# Patient Record
Sex: MALE | Race: WHITE | NOT HISPANIC OR LATINO
[De-identification: names, ages, dates, MRNs, and addresses within clinical notes are randomized per-mention and may not be internally consistent; named-entity substitution may affect disease eponyms.]

---

## 2022-01-06 PROBLEM — Z00.00 ENCOUNTER FOR PREVENTIVE HEALTH EXAMINATION: Status: ACTIVE | Noted: 2022-01-06

## 2022-03-02 ENCOUNTER — APPOINTMENT (OUTPATIENT)
Dept: NEUROSURGERY | Facility: CLINIC | Age: 72
End: 2022-03-02
Payer: MEDICARE

## 2022-03-02 VITALS
HEART RATE: 67 BPM | DIASTOLIC BLOOD PRESSURE: 81 MMHG | HEIGHT: 70 IN | WEIGHT: 240 LBS | BODY MASS INDEX: 34.36 KG/M2 | TEMPERATURE: 97.5 F | SYSTOLIC BLOOD PRESSURE: 136 MMHG | OXYGEN SATURATION: 97 %

## 2022-03-02 DIAGNOSIS — G25.81 RESTLESS LEGS SYNDROME: ICD-10-CM

## 2022-03-02 DIAGNOSIS — Z81.8 FAMILY HISTORY OF OTHER MENTAL AND BEHAVIORAL DISORDERS: ICD-10-CM

## 2022-03-02 DIAGNOSIS — Z80.9 FAMILY HISTORY OF MALIGNANT NEOPLASM, UNSPECIFIED: ICD-10-CM

## 2022-03-02 DIAGNOSIS — Z78.9 OTHER SPECIFIED HEALTH STATUS: ICD-10-CM

## 2022-03-02 PROCEDURE — 99203 OFFICE O/P NEW LOW 30 MIN: CPT

## 2022-03-02 RX ORDER — ATORVASTATIN CALCIUM 10 MG/1
10 TABLET, FILM COATED ORAL
Refills: 0 | Status: ACTIVE | COMMUNITY

## 2022-03-15 NOTE — CONSULT LETTER
[Dear  ___] : Dear  [unfilled], [Courtesy Letter:] : I had the pleasure of seeing your patient, [unfilled], in my office today. [Sincerely,] : Sincerely, [FreeTextEntry2] : Jose Hall MD\par [address]\[place] [address] [FreeTextEntry1] : This very pleasant now 72-year-old gentleman is seen today referred by neurology to assess possible diagnosis of normal pressure hydrocephalus.  The patient is a retired .  He indicates onset event occurred approximately 1 year ago when he had been out to [facility] with family and had an episode of uncontrolled balance where he was leaning forward and race walking and almost fell.  There have been a lesser events.  The patient's wife states that he has begun to stoop forward when he is walking and appears to be unbalanced.  He has not had any falls.  He has significant degeneration and osteoarthritis involving the left knee and has undergone multiple procedures.  He also has had hand and wrist surgeries for Dupuytren's contractures.  The patient has significant family history of early onset dementia in his father.  The patient denies any significant cognitive changes with respect to short-term memory or attention to multiple tasks.  He denies any headaches.  There have not been any visual changes.  He denies any urinary urgency or incontinence.\par \par I have reviewed with the patient and his wife directly the MRI images of the brain performed at [facility].  The significant finding is of expansion of the lateral ventricles and some expansion of the sylvian fissures which is more consistent with age-related brain changes than true expansion of the ventricles under pressure.  There is no significant evidence of transependymal flow.  There are some deep white matter nonspecific changes consistent with microvascular changes.  The cerebellum and brainstem are of normal anatomy.\par \par On examination the patient is in no acute distress.  The patient is articulate and focused and is able to describe his condition and symptoms in detail.  The patient is able to stand spontaneously without any assistance and has good balance.  Romberg test is negative.  He is able to initiate walking without any difficulty.  He has a good narrow stance and his stride is symmetric bilaterally with good clearance.  The patient has very reduced arm swing when walking and a very stiff upper body posture.  He is able to perform a 180 and 360 degree turn with only minimal hesitation.  The patient has no back pain.  There is no limitations of back range of motion.  There is no evidence of radiculopathy.  There is no pronator drift.  The patient does demonstrate some tremor in his left hand with movement.  There is no evidence of pin rolling.  His facial animation appears to be normal.  There is no abnormal movement of the legs at rest but the patient does indicate at nighttime and sometimes at home he will have what he describes as restless legs.\par \par Based on the patient's symptom profile and his physical examination he does not have any of the hallmarks of normal pressure hydrocephalus.  As a result an attempt at lumbar puncture or placement of a lumbar drain for CSF evacuation would not be appropriate.  The patient certainly has difficulty with intention tremor and with restless legs.  There is also concern that he may have progressive age-related changes in mentation based on family history and the MRI imaging.  I have therefore made a referral on to neurology for further ongoing support and evaluation.  At this time I have not arranged for any further follow-up but would be pleased to see the patient again at any time should the need arise.\par \par Thank you for very kindly including me in the evaluation and support of your patient.  Please do not hesitate contact me should you have any concerns or questions regarding this evaluation or the patient's ongoing follow-up care. [FreeTextEntry3] : Andres Krishna MD, PhD, FRCPSC \par Attending Neurosurgeon \par  of Neurosurgery \par [facility] \par [address], [address]nd floor \[place] [address] \par Office: [phone] \par Fax: [phone]\par \par

## 2022-03-15 NOTE — REASON FOR VISIT
[New Patient Visit] : a new patient visit [Spouse] : spouse [FreeTextEntry1] : to evaluate possible normal pressure hydrocephalus

## 2022-05-24 ENCOUNTER — NON-APPOINTMENT (OUTPATIENT)
Age: 72
End: 2022-05-24

## 2022-05-24 ENCOUNTER — APPOINTMENT (OUTPATIENT)
Dept: NEUROLOGY | Facility: CLINIC | Age: 72
End: 2022-05-24
Payer: MEDICARE

## 2022-05-24 VITALS
WEIGHT: 240 LBS | BODY MASS INDEX: 34.36 KG/M2 | TEMPERATURE: 97.8 F | HEIGHT: 70 IN | SYSTOLIC BLOOD PRESSURE: 129 MMHG | DIASTOLIC BLOOD PRESSURE: 88 MMHG | HEART RATE: 64 BPM

## 2022-05-24 PROCEDURE — 99203 OFFICE O/P NEW LOW 30 MIN: CPT

## 2022-05-24 NOTE — HISTORY OF PRESENT ILLNESS
[FreeTextEntry1] : 72-year-old man accompanied by his wife, with complaints of 2 transient short lasting episodes of trouble with balance, in both occasions, he had had a couple of alcoholic beverages, and  found himself feeling as he is falling, no dizziness or vertigo, nausea or speech, trouble with vision, no U. lateral weakness or numbness. In between is noted no significant problem with balance or walking except for painful left knee.\par No numbness tingling of the extremities.He reports an occasional tremor of the hand holding onto objects.\par patient was evaluated by another neurologist, with an MRI of the brain, MR A. old brain and neck. mRI of brain,read as mild hydrocephalus.MRA of brain and neck did not demonstrate any significant stenosis.\par Denies any trouble with vision, no double vision,trouble swallowing, no change in speech, no changes, or constipation.

## 2022-05-24 NOTE — DISCUSSION/SUMMARY
[FreeTextEntry1] : 72-year-old man with 2 episodes of transient loss of balance,both of these episodes associated with use of alcohol. Examination mile parkinsonian symptoms on the right.No evidence of acute ischemia, by MRI, incidental enlarged ventricle, likely not normal pressure hydrocephalus.\par suggestion recommended followup in 4-6 months.\par consider NERY scan (patient wants to wait at this time).

## 2022-05-24 NOTE — PHYSICAL EXAM
[General Appearance - Alert] : alert [General Appearance - In No Acute Distress] : in no acute distress [Oriented To Time, Place, And Person] : oriented to person, place, and time [Impaired Insight] : insight and judgment were intact [Affect] : the affect was normal [Person] : oriented to person [Place] : oriented to place [Time] : oriented to time [Concentration Intact] : normal concentrating ability [Visual Intact] : visual attention was ~T not ~L decreased [Naming Objects] : no difficulty naming common objects [Repeating Phrases] : no difficulty repeating a phrase [Writing A Sentence] : no difficulty writing a sentence [Fluency] : fluency intact [Comprehension] : comprehension intact [Reading] : reading intact [Past History] : adequate knowledge of personal past history [Cranial Nerves Optic (II)] : visual acuity intact bilaterally,  visual fields full to confrontation, pupils equal round and reactive to light [Cranial Nerves Oculomotor (III)] : extraocular motion intact [Cranial Nerves Trigeminal (V)] : facial sensation intact symmetrically [Cranial Nerves Facial (VII)] : face symmetrical [Cranial Nerves Vestibulocochlear (VIII)] : hearing was intact bilaterally [Cranial Nerves Glossopharyngeal (IX)] : tongue and palate midline [Cranial Nerves Accessory (XI - Cranial And Spinal)] : head turning and shoulder shrug symmetric [Cranial Nerves Hypoglossal (XII)] : there was no tongue deviation with protrusion [Motor Strength] : muscle strength was normal in all four extremities [No Muscle Atrophy] : normal bulk in all four extremities [Sensation Tactile Decrease] : light touch was intact [Romberg's Sign] : Romberg's sign was negtive [Abnormal Walk] : normal gait [Balance] : balance was intact [Past-pointing] : there was no past-pointing [Tremor] : a tremor present [Dysdiadochokinesia Bilaterally] : not present [Coordination - Dysmetria Impaired Finger-to-Nose Bilateral] : not present [Coordination - Dysmetria Impaired Heel-to-Shin Bilateral] : not present [1+] : Ankle jerk left 1+ [___] : absent on the right [___] : absent on the left [FreeTextEntry8] : postural tremor of the hands. Questionable mild cogwheeling of the right upper extremity. mildly decreased arm swing, right more than left

## 2022-06-14 ENCOUNTER — APPOINTMENT (OUTPATIENT)
Dept: ORTHOPEDIC SURGERY | Facility: CLINIC | Age: 72
End: 2022-06-14
Payer: MEDICARE

## 2022-06-14 ENCOUNTER — RESULT CHARGE (OUTPATIENT)
Age: 72
End: 2022-06-14

## 2022-06-14 ENCOUNTER — APPOINTMENT (OUTPATIENT)
Dept: ORTHOPEDIC SURGERY | Facility: CLINIC | Age: 72
End: 2022-06-14

## 2022-06-14 VITALS — BODY MASS INDEX: 33.6 KG/M2 | WEIGHT: 240 LBS | HEIGHT: 71 IN

## 2022-06-14 PROBLEM — Z00.00 ENCOUNTER FOR PREVENTIVE HEALTH EXAMINATION: Noted: 2022-06-14

## 2022-06-14 PROCEDURE — 99205 OFFICE O/P NEW HI 60 MIN: CPT

## 2022-06-14 PROCEDURE — 73562 X-RAY EXAM OF KNEE 3: CPT | Mod: LT

## 2022-06-14 NOTE — ASSESSMENT
[FreeTextEntry1] : 6/14/22: Discussed anti-inflammatories, PT/HEP, CSI, visco injections, and TKA. He has done conservative treatment that is no longer providing any relief. Pain affects his ADLs. Discussed TKA, r/b/a, and preop/postop periods in detail. Discussed possible injury to skin from his hx of multiple scars from previous surgeries; will address this accordingly. He would like to proceed with surgery. Will obtain CT L knee to eval for bone loss and for presurgical eval.

## 2022-06-14 NOTE — DISCUSSION/SUMMARY
[de-identified] : The natural progression of Osteoarthritis was explained to the patient.  We discussed the possible treatment options from conservative to operative.  These included NSAIDS, Glucosamine and Chondrotin sulfate, and Physical Therapy as well different types of injections.  We also discussed that at some point they may progress to needed a TKA.  Information and pamphlets were given.\par \par We discussed my findings and the natural history of their condition.  We talked about the details of the proposed surgery and the recovery.  We discussed the material risks, possible benefits and alternatives to surgery.  The risks include but are not limited to infection, bleeding and possible need for blood transfusion, fracture, bowel blockage, bladder retention or infection, need for reoperation, stiffness and/or limited range of motion, possible damage to nerves and blood vessels, failure of fixation of components, risk of deep vein thromboses and pulmonary embolism, wound healing problems, dislocation, and possible leg length discrepancy.  Although incredibly rare, we also discussed the risks of a cardiac event, stroke and even death during, or following, the surgery.  We discussed the type of implants the patient will be receiving and the type of fixation that will be used, as well as whether a robot or computer navigation aide will be used.  The patient understands they will need medical clearance and will attend a preoperative joint education class.  We also discussed the type of anesthesia they will receive, and the risks associated with hospital or rehab length of stay, obesity, diabetes and smoking.\par \par Progress note completed by Shellie Sandra PA-C.\par The documentation recorded by the PA accurately reflects the service I personally performed and the decisions made by me. -Dr. Infante\par

## 2022-06-14 NOTE — HISTORY OF PRESENT ILLNESS
[Sudden] : sudden [7] : 7 [4] : 4 [Dull/Aching] : dull/aching [Throbbing] : throbbing [de-identified] : 6/14/22: 71yo M with longstanding left knee pain for the past few years with no injury. Hx of multiple arthroscopies in this knee many years ago. He has been treated by outside ortho for this issue with anti-inflammatories, CSI, and visco inj. Admits to increasing pain and difficulty with prolonged walking and stairs.  [] : no [FreeTextEntry5] : has seen another orthopedic and has gotten sx relief Motrin, Tylenol, CBD cream has tried CSI, gel shots

## 2022-06-14 NOTE — IMAGING
[Left] : left knee [de-identified] : Left knee\par Multiple scars from previous surgeries \par Medial and lateral joint line tenderness\par ROM 0-115 [FreeTextEntry9] : Adv knee OA

## 2022-06-19 ENCOUNTER — FORM ENCOUNTER (OUTPATIENT)
Age: 72
End: 2022-06-19

## 2022-06-20 ENCOUNTER — APPOINTMENT (OUTPATIENT)
Dept: CT IMAGING | Facility: CLINIC | Age: 72
End: 2022-06-20
Payer: MEDICARE

## 2022-06-20 PROCEDURE — 76376 3D RENDER W/INTRP POSTPROCES: CPT | Mod: LT

## 2022-06-20 PROCEDURE — 73700 CT LOWER EXTREMITY W/O DYE: CPT | Mod: LT

## 2022-08-08 ENCOUNTER — OUTPATIENT (OUTPATIENT)
Dept: OUTPATIENT SERVICES | Facility: HOSPITAL | Age: 72
LOS: 1 days | Discharge: HOME | End: 2022-08-08

## 2022-08-08 VITALS
HEIGHT: 71 IN | OXYGEN SATURATION: 98 % | HEART RATE: 60 BPM | WEIGHT: 252.43 LBS | TEMPERATURE: 98 F | SYSTOLIC BLOOD PRESSURE: 126 MMHG | DIASTOLIC BLOOD PRESSURE: 73 MMHG | RESPIRATION RATE: 18 BRPM

## 2022-08-08 DIAGNOSIS — Z98.890 OTHER SPECIFIED POSTPROCEDURAL STATES: Chronic | ICD-10-CM

## 2022-08-08 DIAGNOSIS — Z01.818 ENCOUNTER FOR OTHER PREPROCEDURAL EXAMINATION: ICD-10-CM

## 2022-08-08 DIAGNOSIS — E78.5 HYPERLIPIDEMIA, UNSPECIFIED: ICD-10-CM

## 2022-08-08 DIAGNOSIS — M17.12 UNILATERAL PRIMARY OSTEOARTHRITIS, LEFT KNEE: ICD-10-CM

## 2022-08-08 LAB
A1C WITH ESTIMATED AVERAGE GLUCOSE RESULT: 5.6 % — SIGNIFICANT CHANGE UP (ref 4–5.6)
ALBUMIN SERPL ELPH-MCNC: 3.9 G/DL — SIGNIFICANT CHANGE UP (ref 3.3–5)
ALP SERPL-CCNC: 81 U/L — SIGNIFICANT CHANGE UP (ref 40–120)
ALT FLD-CCNC: 37 U/L — SIGNIFICANT CHANGE UP (ref 12–78)
ANION GAP SERPL CALC-SCNC: 8 MMOL/L — SIGNIFICANT CHANGE UP (ref 5–17)
APTT BLD: 28 SEC — SIGNIFICANT CHANGE UP (ref 27.5–35.5)
AST SERPL-CCNC: 20 U/L — SIGNIFICANT CHANGE UP (ref 15–37)
BASOPHILS # BLD AUTO: 0.06 K/UL — SIGNIFICANT CHANGE UP (ref 0–0.2)
BASOPHILS NFR BLD AUTO: 1 % — SIGNIFICANT CHANGE UP (ref 0–2)
BILIRUB SERPL-MCNC: 0.4 MG/DL — SIGNIFICANT CHANGE UP (ref 0.2–1.2)
BLD GP AB SCN SERPL QL: SIGNIFICANT CHANGE UP
BUN SERPL-MCNC: 21 MG/DL — SIGNIFICANT CHANGE UP (ref 7–23)
CALCIUM SERPL-MCNC: 9 MG/DL — SIGNIFICANT CHANGE UP (ref 8.5–10.1)
CHLORIDE SERPL-SCNC: 110 MMOL/L — HIGH (ref 96–108)
CO2 SERPL-SCNC: 24 MMOL/L — SIGNIFICANT CHANGE UP (ref 22–31)
CREAT SERPL-MCNC: 1.35 MG/DL — HIGH (ref 0.5–1.3)
EGFR: 56 ML/MIN/1.73M2 — LOW
EOSINOPHIL # BLD AUTO: 0.15 K/UL — SIGNIFICANT CHANGE UP (ref 0–0.5)
EOSINOPHIL NFR BLD AUTO: 2.4 % — SIGNIFICANT CHANGE UP (ref 0–6)
ESTIMATED AVERAGE GLUCOSE: 114 MG/DL — SIGNIFICANT CHANGE UP (ref 68–114)
GLUCOSE SERPL-MCNC: 97 MG/DL — SIGNIFICANT CHANGE UP (ref 70–99)
HCT VFR BLD CALC: 45.1 % — SIGNIFICANT CHANGE UP (ref 39–50)
HGB BLD-MCNC: 15 G/DL — SIGNIFICANT CHANGE UP (ref 13–17)
IMM GRANULOCYTES NFR BLD AUTO: 0.3 % — SIGNIFICANT CHANGE UP (ref 0–1.5)
INR BLD: 0.94 RATIO — SIGNIFICANT CHANGE UP (ref 0.88–1.16)
LYMPHOCYTES # BLD AUTO: 1.37 K/UL — SIGNIFICANT CHANGE UP (ref 1–3.3)
LYMPHOCYTES # BLD AUTO: 21.9 % — SIGNIFICANT CHANGE UP (ref 13–44)
MCHC RBC-ENTMCNC: 30.5 PG — SIGNIFICANT CHANGE UP (ref 27–34)
MCHC RBC-ENTMCNC: 33.3 G/DL — SIGNIFICANT CHANGE UP (ref 32–36)
MCV RBC AUTO: 91.7 FL — SIGNIFICANT CHANGE UP (ref 80–100)
MONOCYTES # BLD AUTO: 0.61 K/UL — SIGNIFICANT CHANGE UP (ref 0–0.9)
MONOCYTES NFR BLD AUTO: 9.7 % — SIGNIFICANT CHANGE UP (ref 2–14)
MRSA PCR RESULT.: SIGNIFICANT CHANGE UP
NEUTROPHILS # BLD AUTO: 4.05 K/UL — SIGNIFICANT CHANGE UP (ref 1.8–7.4)
NEUTROPHILS NFR BLD AUTO: 64.7 % — SIGNIFICANT CHANGE UP (ref 43–77)
NRBC # BLD: 0 /100 WBCS — SIGNIFICANT CHANGE UP (ref 0–0)
PLATELET # BLD AUTO: 254 K/UL — SIGNIFICANT CHANGE UP (ref 150–400)
POTASSIUM SERPL-MCNC: 4.1 MMOL/L — SIGNIFICANT CHANGE UP (ref 3.5–5.3)
POTASSIUM SERPL-SCNC: 4.1 MMOL/L — SIGNIFICANT CHANGE UP (ref 3.5–5.3)
PROT SERPL-MCNC: 7.3 GM/DL — SIGNIFICANT CHANGE UP (ref 6–8.3)
PROTHROM AB SERPL-ACNC: 11.2 SEC — SIGNIFICANT CHANGE UP (ref 10.5–13.4)
RBC # BLD: 4.92 M/UL — SIGNIFICANT CHANGE UP (ref 4.2–5.8)
RBC # FLD: 14 % — SIGNIFICANT CHANGE UP (ref 10.3–14.5)
S AUREUS DNA NOSE QL NAA+PROBE: SIGNIFICANT CHANGE UP
SODIUM SERPL-SCNC: 142 MMOL/L — SIGNIFICANT CHANGE UP (ref 135–145)
VIT D25+D1,25 OH+D1,25 PNL SERPL-MCNC: 54 PG/ML — SIGNIFICANT CHANGE UP (ref 19.9–79.3)
WBC # BLD: 6.26 K/UL — SIGNIFICANT CHANGE UP (ref 3.8–10.5)
WBC # FLD AUTO: 6.26 K/UL — SIGNIFICANT CHANGE UP (ref 3.8–10.5)

## 2022-08-08 PROCEDURE — 93010 ELECTROCARDIOGRAM REPORT: CPT

## 2022-08-08 NOTE — H&P PST ADULT - NSICDXPASTSURGICALHX_GEN_ALL_CORE_FT
PAST SURGICAL HISTORY:  H/O arthroscopy of left knee x3    H/O thumb surgery left thumb- nerve impigment

## 2022-08-08 NOTE — OCCUPATIONAL THERAPY INITIAL EVALUATION ADULT - PERTINENT HX OF CURRENT PROBLEM, REHAB EVAL
Pt is a 71 y/o male slated for elective surgery for TKR wit MD Infante on 8/25/22, due to OA, chronic. Pt reported daily buckling in his left knee, but denied any  falls in the past 3-6 months  pain and DJD. Pt is a 73 y/o male slated for elective surgery for TKR with MD Infante on 8/25/22, due to OA, chronic. Pt reported daily buckling in his left knee, but denied any  falls in the past 3-6 months  pain and DJD.

## 2022-08-08 NOTE — OCCUPATIONAL THERAPY INITIAL EVALUATION ADULT - ANTICIPATED DISCHARGE DISPOSITION, OT EVAL
Recommend home with OT referral to enable patient to safely perform ADL management and functional mobility. Pt owns a 3-in-1 commode, rolling walker and SAC. All are in good condition and easily accessible .

## 2022-08-08 NOTE — OCCUPATIONAL THERAPY INITIAL EVALUATION ADULT - RANGE OF MOTION EXAMINATION, LOWER EXTREMITY
Left LE Passive ROM was WFL (w/i functional limits)/Left LE Active Assistive ROM was WFL (within functional limits)/Right LE Active ROM was WNL(within normal limits)/Right LE Passive ROM was WNL (within normal limits)

## 2022-08-08 NOTE — OCCUPATIONAL THERAPY INITIAL EVALUATION ADULT - LIVES WITH, PROFILE
in a private house with 2 entry steps from the garage equipped with bilateral hand rails that can be reached simultaneously.  Once inside, pt has to negotiate a flight of stairs, with 15 steps, left ascending handrail, to access the bedroom and bathroom.  The bathroom has a walk in shower, fixed / retractable shower head and standard toilet with adequate space to fit a commode over it./spouse in a private house with 2 entry steps from the garage equipped with bilateral hand rails that can be reached simultaneously. Once inside, pt has to negotiate a flight of stairs, with 15 steps, left ascending handrail, to access the bedroom and bathroom.  The bathroom has a walk in shower, fixed / retractable shower head and standard toilet with adequate space to fit a commode over it./spouse

## 2022-08-08 NOTE — H&P PST ADULT - HISTORY OF PRESENT ILLNESS
72 year old male with a past medical history of HLD c/o pain and limited ROM t to left knee secondary to osteoarthritis.  He is scheduled for  a robotic assisted left total knee arthroplasty with MARK on 8/25/2022.    He denies fever, cough, SOB, recent travels, and sick contacts.    Goal: to walk without pain

## 2022-08-08 NOTE — PHYSICAL THERAPY INITIAL EVALUATION ADULT - ADDITIONAL COMMENTS
n a private house with 2 entry steps from the garage equipped with bilateral hand rails that can be reached simultaneously.  Once inside, pt has to negotiate a flight of stairs, with 15 steps, left ascending handrail, to access the bedroom and bathroom.  The bathroom has a walk in shower, fixed / retractable shower head and standard toilet with adequate space to fit a commode over it.

## 2022-08-08 NOTE — H&P PST ADULT - ASSESSMENT
72 year old male with a past medical history of HLD c/o pain and limited ROM t to left knee secondary to osteoarthritis.  He is scheduled for  a robotic assisted left total knee arthroplasty with MARK on 2022.    CAPRINI SCORE [CLOT]    AGE RELATED RISK FACTORS                                                       MOBILITY RELATED FACTORS  [ ] Age 41-60 years                                            (1 Point)                  [ ] Bed rest                                                        (1 Point)  [ x] Age: 61-74 years                                           (2 Points)                 [ ] Plaster cast                                                   (2 Points)  [ ] Age= 75 years                                              (3 Points)                 [ ] Bed bound for more than 72 hours                 (2 Points)    DISEASE RELATED RISK FACTORS                                               GENDER SPECIFIC FACTORS  [ x] Edema in the lower extremities                       (1 Point)                  [ ] Pregnancy                                                     (1 Point)  [ ] Varicose veins                                               (1 Point)                  [ ] Post-partum < 6 weeks                                   (1 Point)             [ ] BMI > 25 Kg/m2                                            (1 Point)                  [ ] Hormonal therapy  or oral contraception          (1 Point)                 [ ] Sepsis (in the previous month)                        (1 Point)                  [ ] History of pregnancy complications                 (1 point)  [ ] Pneumonia or serious lung disease                                               [ ] Unexplained or recurrent                     (1 Point)           (in the previous month)                               (1 Point)  [ ] Abnormal pulmonary function test                     (1 Point)                 SURGERY RELATED RISK FACTORS  [ ] Acute myocardial infarction                              (1 Point)                 [ ]  Section                                             (1 Point)  [ ] Congestive heart failure (in the previous month)  (1 Point)               [ ] Minor surgery                                                  (1 Point)   [ ] Inflammatory bowel disease                             (1 Point)                 [ ] Arthroscopic surgery                                        (2 Points)  [ ] Central venous access                                      (2 Points)                [ ] General surgery lasting more than 45 minutes   (2 Points)       [ ] Stroke (in the previous month)                          (5 Points)               [x ] Elective arthroplasty                                         (5 Points)                                                                                                                                               HEMATOLOGY RELATED FACTORS                                                 TRAUMA RELATED RISK FACTORS  [ ] Prior episodes of VTE                                     (3 Points)                [ ] Fracture of the hip, pelvis, or leg                       (5 Points)  [ ] Positive family history for VTE                         (3 Points)                 [ ] Acute spinal cord injury (in the previous month)  (5 Points)  [ ] Prothrombin 05096 A                                     (3 Points)                 [ ] Paralysis  (less than 1 month)                             (5 Points)  [ ] Factor V Leiden                                             (3 Points)                  [ ] Multiple Trauma within 1 month                        (5 Points)  [ ] Lupus anticoagulants                                     (3 Points)                                                           [ ] Anticardiolipin antibodies                               (3 Points)                                                       [ ] High homocysteine in the blood                      (3 Points)                                             [ ] Other congenital or acquired thrombophilia      (3 Points)                                                [ ] Heparin induced thrombocytopenia                  (3 Points)                                          Total Score [       8   ]    Caprini Score 0 - 2:  Low Risk, No VTE Prophylaxis required for most patients, encourage ambulation  Caprini Score 3 - 6:  At Risk, pharmacologic VTE prophylaxis is indicated for most patients (in the absence of a contraindication)  Caprini Score Greater than or = 7:  High Risk, pharmacologic VTE prophylaxis is indicated for most patients (in the absence of a contraindication)    Caprini score indicates that the patient is high risk for VTE event ( score 6 or greater). Surgical patient's in this group will benefit from both pharmacologic prophylaxis and intermittent compression devices . Surgical team will determine the balance between VTE  risk and bleeding risk and other clinical considerations

## 2022-08-08 NOTE — PHYSICAL THERAPY INITIAL EVALUATION ADULT - PERTINENT HX OF CURRENT PROBLEM, REHAB EVAL
Patient attends pre-op testing today following consult c Dr. jonas  due to chronic pain to left knee. Elective LTKR  is now scheduled in this facility for 8/25/22.

## 2022-08-08 NOTE — OCCUPATIONAL THERAPY INITIAL EVALUATION ADULT - GENERAL OBSERVATIONS, REHAB EVAL
Chart reviewed. Patient encountered seated in chair in rehab preop room in [facility]. Patient underwent occupational therapy pre-operative consultation to determine current functional ADL limitations in order to provide the right equipment for patient to perform functional ADL post operation.

## 2022-08-08 NOTE — OCCUPATIONAL THERAPY INITIAL EVALUATION ADULT - ADDITIONAL COMMENTS
At this time, pt is functioning in his roles, self sufficient, driving & ambulating independently in the community without any assistive devices. Pt owns  all the required DME. Pt c/o varying 7/10 pain in his left knee  with highest intensity 10/10. The pain is exacerbated, by walking, prolonged standing, negotiating steps and is relieved with Motrin, CBD gummies  or Advil.. Pt is right hand dominant and wears glasses for reading. Pt scores 90% of patient specific scale .

## 2022-08-08 NOTE — H&P PST ADULT - PROBLEM SELECTOR PLAN 2
labs - cbc,pt/ptt,bmp,t&s,nose cx,ekg  M/C required  preop 3 day Hibiclens instruction reviewed and given .instructed on if  nose cx positive use Mupirocin 5 days and checklist given  take routine meds DOS with sips of water. avoid NSAID and OTC supplements. verbalized understanding  information on proper nutrition , increase protein and better food choices provided in packet   ensure clear provided  pt aware covid swab is required 3-5 days prior to surgery  anesthesiologist to review PST labs, EKG, medical clearance and optimization for surgery

## 2022-08-24 ENCOUNTER — FORM ENCOUNTER (OUTPATIENT)
Age: 72
End: 2022-08-24

## 2022-08-25 ENCOUNTER — TRANSCRIPTION ENCOUNTER (OUTPATIENT)
Age: 72
End: 2022-08-25

## 2022-08-25 ENCOUNTER — APPOINTMENT (OUTPATIENT)
Dept: ORTHOPEDIC SURGERY | Facility: HOSPITAL | Age: 72
End: 2022-08-25

## 2022-08-25 ENCOUNTER — OUTPATIENT (OUTPATIENT)
Dept: OUTPATIENT SERVICES | Facility: HOSPITAL | Age: 72
LOS: 1 days | Discharge: HOME | End: 2022-08-25

## 2022-08-25 DIAGNOSIS — Z98.890 OTHER SPECIFIED POSTPROCEDURAL STATES: Chronic | ICD-10-CM

## 2022-08-25 PROCEDURE — 27447 TOTAL KNEE ARTHROPLASTY: CPT | Mod: AS,LT

## 2022-08-25 PROCEDURE — 27447 TOTAL KNEE ARTHROPLASTY: CPT | Mod: LT

## 2022-08-25 PROCEDURE — 20985 CPTR-ASST DIR MS PX: CPT

## 2022-08-26 ENCOUNTER — TRANSCRIPTION ENCOUNTER (OUTPATIENT)
Age: 72
End: 2022-08-26

## 2022-09-02 DIAGNOSIS — M17.12 UNILATERAL PRIMARY OSTEOARTHRITIS, LEFT KNEE: ICD-10-CM

## 2022-09-02 DIAGNOSIS — E78.5 HYPERLIPIDEMIA, UNSPECIFIED: ICD-10-CM

## 2022-09-02 DIAGNOSIS — Z91.013 ALLERGY TO SEAFOOD: ICD-10-CM

## 2022-09-06 ENCOUNTER — APPOINTMENT (OUTPATIENT)
Dept: ORTHOPEDIC SURGERY | Facility: CLINIC | Age: 72
End: 2022-09-06

## 2022-09-06 PROCEDURE — 99024 POSTOP FOLLOW-UP VISIT: CPT

## 2022-09-06 PROCEDURE — 73562 X-RAY EXAM OF KNEE 3: CPT | Mod: LT

## 2022-09-06 NOTE — DISCUSSION/SUMMARY
[de-identified] : The patient is doing well at this time. The patient will be started on a course of physical therapy. I recommended that the patient works on range of motion at home and was shown how to do this. I encouraged the patient to increase ambulation. The patient can continue to take Tylenol for occasional discomfort. The patient was advised not to do any dental work for the first three months following the surgery. We will see the patient  back for a follow-up for a repeat evaluation. The patient  will call or return earlier for any questions or concerns.  Signs and symptoms of infection reviewed and patient advised to call immediately for redness, fevers, and/or chills. \par \par The incision was inspected and was clean and dry with no drainage.  The patient was instructed to call for fevers, chills, wound drainage, wound opening, redness, or any other concerns. \par \par Progress note completed by Shellie Sandra PA-C.\par The documentation recorded by the PA accurately reflects the service I personally performed and the decisions made by me. -Dr. Infante

## 2022-09-06 NOTE — PHYSICAL EXAM
[de-identified] : Left knee \par Incision is clean and dry with no drainage - dressing removed -\par Sensation intact\par Motor 5/5 \par +1 edema LE\par ROM 0-90\par \par Xray 3 views L knee - Implants good position and well fixed

## 2022-09-06 NOTE — HISTORY OF PRESENT ILLNESS
[3] : 3 [0] : 0 [Rest] : rest [Exercising] : exercising [] : Post Surgical Visit: yes [de-identified] : 9/6/22: 12 days s/p L TKA. He is doing well today with minimal complaints. Taking narcotics sparingly. Ambulation with crutch\par \par Previous doc:\par 6/14/22: 73yo M with longstanding left knee pain for the past few years with no injury. Hx of multiple arthroscopies in this knee many years ago. He has been treated by outside ortho for this issue with anti-inflammatories, CSI, and visco inj. Admits to increasing pain and difficulty with prolonged walking and stairs.  [FreeTextEntry1] : left knee  [FreeTextEntry5] : pt states knee pain is minimal today.  [de-identified] : home PT  [de-identified] : 08/25/22 [de-identified] : L TKA

## 2022-09-06 NOTE — ASSESSMENT
[FreeTextEntry1] : 6/14/22: Discussed anti-inflammatories, PT/HEP, CSI, visco injections, and TKA. He has done conservative treatment that is no longer providing any relief. Pain affects his ADLs. Discussed TKA, r/b/a, and preop/postop periods in detail. Discussed possible injury to skin from his hx of multiple scars from previous surgeries; will address this accordingly. He would like to proceed with surgery. Will obtain CT L knee to eval for bone loss and for presurgical eval. \par \par 9/6/22: Nearly 2wks postop. He is doing well today and is happy with progress. Begin outpatient PT.

## 2022-10-04 ENCOUNTER — RESULT CHARGE (OUTPATIENT)
Age: 72
End: 2022-10-04

## 2022-10-04 ENCOUNTER — APPOINTMENT (OUTPATIENT)
Dept: ORTHOPEDIC SURGERY | Facility: CLINIC | Age: 72
End: 2022-10-04

## 2022-10-04 DIAGNOSIS — M17.12 UNILATERAL PRIMARY OSTEOARTHRITIS, LEFT KNEE: ICD-10-CM

## 2022-10-04 PROCEDURE — 99024 POSTOP FOLLOW-UP VISIT: CPT

## 2022-10-04 PROCEDURE — 73562 X-RAY EXAM OF KNEE 3: CPT | Mod: LT

## 2022-10-04 NOTE — ASSESSMENT
[FreeTextEntry1] : Previous doc:\par 6/14/22: Discussed anti-inflammatories, PT/HEP, CSI, visco injections, and TKA. He has done conservative treatment that is no longer providing any relief. Pain affects his ADLs. Discussed TKA, r/b/a, and preop/postop periods in detail. Discussed possible injury to skin from his hx of multiple scars from previous surgeries; will address this accordingly. He would like to proceed with surgery. Will obtain CT L knee to eval for bone loss and for presurgical eval. \par 9/6/22: Nearly 2wks postop. He is doing well today and is happy with progress. Begin outpatient PT. \par \par 10/4/22: 6 weeks postop doing well.  Flex to 95 today in office but states he gets to 115 at PT.  Preop .  Cont PT, renewed oxy - he takes them sparingly at this point.

## 2022-10-04 NOTE — DISCUSSION/SUMMARY
[de-identified] : The patient was advised of the diagnosis.  The natural history of the pathology was explained in full to the patient in layman's terms. All questions were answered.  The risks and benefits of surgical and non-surgical treatment alternatives were explained in full to the patient.\par

## 2022-10-04 NOTE — HISTORY OF PRESENT ILLNESS
[Dull/Aching] : dull/aching [Intermittent] : intermittent [Meds] : meds [Physical therapy] : physical therapy [de-identified] : 10/4/22: 6 weeks postop, improving.  Pain with PT.\par \par Previous doc:\par 6/14/22: 71yo M with longstanding left knee pain for the past few years with no injury. Hx of multiple arthroscopies in this knee many years ago. He has been treated by outside ortho for this issue with anti-inflammatories, CSI, and visco inj. Admits to increasing pain and difficulty with prolonged walking and stairs. \par 9/6/22: 12 days s/p L TKA. He is doing well today with minimal complaints. Taking narcotics sparingly. Ambulation with crutch [] : no [FreeTextEntry1] : left knee  [FreeTextEntry5] : pt states pain is decreasing since last visit.  [de-identified] : 08/25/22 [de-identified] :  L TKA

## 2022-10-04 NOTE — PHYSICAL EXAM
[Left] : left knee [AP] : anteroposterior [Lateral] : lateral [[place]] : skyline [Components well fixed, in good position] : Components well fixed, in good position [de-identified] : Left knee: Inc healed.  Effusion.  ROM 0-95.  Stable.

## 2022-11-01 ENCOUNTER — APPOINTMENT (OUTPATIENT)
Dept: ORTHOPEDIC SURGERY | Facility: CLINIC | Age: 72
End: 2022-11-01

## 2022-11-01 VITALS — WEIGHT: 240 LBS | BODY MASS INDEX: 33.6 KG/M2 | HEIGHT: 71 IN

## 2022-11-01 PROCEDURE — 99024 POSTOP FOLLOW-UP VISIT: CPT

## 2022-11-01 NOTE — ASSESSMENT
[FreeTextEntry1] : Previous doc:\par 6/14/22: Discussed anti-inflammatories, PT/HEP, CSI, visco injections, and TKA. He has done conservative treatment that is no longer providing any relief. Pain affects his ADLs. Discussed TKA, r/b/a, and preop/postop periods in detail. Discussed possible injury to skin from his hx of multiple scars from previous surgeries; will address this accordingly. He would like to proceed with surgery. Will obtain CT L knee to eval for bone loss and for presurgical eval. \par 9/6/22: Nearly 2wks postop. He is doing well today and is happy with progress. Begin outpatient PT. \par 10/4/22: 6 weeks postop doing well.  Flex to 95 today in office but states he gets to 115 at PT.  Preop .  Cont PT, renewed oxy - he takes them sparingly at this point.\par \par 11/1 doing well but small step back with difficult PT - I will have him rest the rest of the week and start again - also ice and restart Mobic --

## 2022-11-01 NOTE — HISTORY OF PRESENT ILLNESS
[de-identified] : 11/1 10 weeks doing well but has pain at end of day - he  had 125 with PT but a lot of pain -  yesterday only 110  \par \par Previous doc:\par 6/14/22: 73yo M with longstanding left knee pain for the past few years with no injury. Hx of multiple arthroscopies in this knee many years ago. He has been treated by outside ortho for this issue with anti-inflammatories, CSI, and visco inj. Admits to increasing pain and difficulty with prolonged walking and stairs. \par 9/6/22: 12 days s/p L TKA. He is doing well today with minimal complaints. Taking narcotics sparingly. Ambulation with crutch\par 10/4/22: 6 weeks postop, improving.  Pain with PT.\par  [] : no [FreeTextEntry1] : Left knee [FreeTextEntry5] : LOIS CARTER is a 72 year old M here for PO #3 for the left knee. Pt states that physical therapy makes the pain worse, since they are bending his knee. He says his overall pain is about the same since his last visit. [de-identified] : 8/25/2022 [de-identified] : 10/31/2022 [de-identified] : 8/25/2022 [de-identified] : Left total knee arthroplasty

## 2022-11-01 NOTE — PHYSICAL EXAM
[de-identified] : Left knee: Inc healed.  Effusion.  ROM 0-105.  Stable. [Left] : left knee [AP] : anteroposterior [Lateral] : lateral [[place]] : skyline [Components well fixed, in good position] : Components well fixed, in good position

## 2022-11-01 NOTE — DISCUSSION/SUMMARY
[de-identified] : The patient was advised of the diagnosis.  The natural history of the pathology was explained in full to the patient in layman's terms. All questions were answered.  The risks and benefits of surgical and non-surgical treatment alternatives were explained in full to the patient.\par

## 2022-12-01 ENCOUNTER — APPOINTMENT (OUTPATIENT)
Dept: NEUROLOGY | Facility: CLINIC | Age: 72
End: 2022-12-01

## 2022-12-02 ENCOUNTER — APPOINTMENT (OUTPATIENT)
Dept: ORTHOPEDIC SURGERY | Facility: CLINIC | Age: 72
End: 2022-12-02

## 2022-12-02 VITALS — HEIGHT: 71 IN | BODY MASS INDEX: 33.6 KG/M2 | WEIGHT: 240 LBS

## 2022-12-02 DIAGNOSIS — Z96.652 PRESENCE OF LEFT ARTIFICIAL KNEE JOINT: ICD-10-CM

## 2022-12-02 PROCEDURE — 99213 OFFICE O/P EST LOW 20 MIN: CPT

## 2022-12-02 NOTE — HISTORY OF PRESENT ILLNESS
[5] : 5 [2] : 2 [Dull/Aching] : dull/aching [Intermittent] : intermittent [Household chores] : household chores [Leisure] : leisure [Meds] : meds [Physical therapy] : physical therapy [Sitting] : sitting [Walking] : walking [de-identified] : 12/2/22: 14 weeks s/p L TKA. He has been improving after light exercise in PT and taking Mobic. Getting to ~120 with assistance in PT. Minimal pain\par \par Previous doc:\par 6/14/22: 71yo M with longstanding left knee pain for the past few years with no injury. Hx of multiple arthroscopies in this knee many years ago. He has been treated by outside ortho for this issue with anti-inflammatories, CSI, and visco inj. Admits to increasing pain and difficulty with prolonged walking and stairs. \par 9/6/22: 12 days s/p L TKA. He is doing well today with minimal complaints. Taking narcotics sparingly. Ambulation with crutch\par 10/4/22: 6 weeks postop, improving.  Pain with PT.\par 11/1 10 weeks doing well but has pain at end of day - he  had 125 with PT but a lot of pain -  yesterday only 110   [] : no [FreeTextEntry1] : left knee [FreeTextEntry5] : Pt is here to follow up on left knee. Pt had sx on 08/25/22 - LT TKA. Pt is currently doing physical therapy 2x a week, and finds it does assist pain. Pt states pain has improved since last visit. Pt states pain is prevalent with prolonged rest. Pt will take Meloxicam when needed. Pt ices and heats daily.

## 2022-12-02 NOTE — ASSESSMENT
[FreeTextEntry1] : Previous doc:\par 6/14/22: Discussed anti-inflammatories, PT/HEP, CSI, visco injections, and TKA. He has done conservative treatment that is no longer providing any relief. Pain affects his ADLs. Discussed TKA, r/b/a, and preop/postop periods in detail. Discussed possible injury to skin from his hx of multiple scars from previous surgeries; will address this accordingly. He would like to proceed with surgery. Will obtain CT L knee to eval for bone loss and for presurgical eval. \par 9/6/22: Nearly 2wks postop. He is doing well today and is happy with progress. Begin outpatient PT. \par 10/4/22: 6 weeks postop doing well.  Flex to 95 today in office but states he gets to 115 at PT.  Preop .  Cont PT, renewed oxy - he takes them sparingly at this point.\par 11/1 doing well but small step back with difficult PT - I will have him rest the rest of the week and start again - also ice and restart Mobic --  \par \par 12/2/22: 14wks postop L TKA.  in PT, 105 in office today. Continue with PT and transition to HEP. Dental prophylaxis precautions give. f/up at 1 year anniversary

## 2023-02-14 ENCOUNTER — INPATIENT (INPATIENT)
Facility: HOSPITAL | Age: 73
LOS: 1 days | Discharge: HOME | DRG: 642 | End: 2023-02-16
Attending: STUDENT IN AN ORGANIZED HEALTH CARE EDUCATION/TRAINING PROGRAM | Admitting: STUDENT IN AN ORGANIZED HEALTH CARE EDUCATION/TRAINING PROGRAM
Payer: MEDICARE

## 2023-02-14 ENCOUNTER — APPOINTMENT (OUTPATIENT)
Dept: NEUROLOGY | Facility: CLINIC | Age: 73
End: 2023-02-14
Payer: MEDICARE

## 2023-02-14 VITALS
HEIGHT: 71 IN | HEART RATE: 80 BPM | BODY MASS INDEX: 33.46 KG/M2 | SYSTOLIC BLOOD PRESSURE: 115 MMHG | TEMPERATURE: 97.8 F | WEIGHT: 239 LBS | DIASTOLIC BLOOD PRESSURE: 80 MMHG

## 2023-02-14 VITALS
DIASTOLIC BLOOD PRESSURE: 80 MMHG | HEART RATE: 91 BPM | SYSTOLIC BLOOD PRESSURE: 119 MMHG | TEMPERATURE: 100 F | RESPIRATION RATE: 18 BRPM | WEIGHT: 238.98 LBS | HEIGHT: 71 IN | OXYGEN SATURATION: 96 %

## 2023-02-14 DIAGNOSIS — G20 PARKINSON'S DISEASE: ICD-10-CM

## 2023-02-14 DIAGNOSIS — Z98.890 OTHER SPECIFIED POSTPROCEDURAL STATES: Chronic | ICD-10-CM

## 2023-02-14 PROCEDURE — 71045 X-RAY EXAM CHEST 1 VIEW: CPT | Mod: 26

## 2023-02-14 PROCEDURE — 99285 EMERGENCY DEPT VISIT HI MDM: CPT

## 2023-02-14 PROCEDURE — 99213 OFFICE O/P EST LOW 20 MIN: CPT

## 2023-02-14 RX ORDER — ACETAMINOPHEN 500 MG
650 TABLET ORAL ONCE
Refills: 0 | Status: COMPLETED | OUTPATIENT
Start: 2023-02-14 | End: 2023-02-14

## 2023-02-14 RX ORDER — SODIUM CHLORIDE 9 MG/ML
1000 INJECTION INTRAMUSCULAR; INTRAVENOUS; SUBCUTANEOUS ONCE
Refills: 0 | Status: COMPLETED | OUTPATIENT
Start: 2023-02-14 | End: 2023-02-14

## 2023-02-14 RX ORDER — AMOXICILLIN 500 MG/1
500 TABLET, FILM COATED ORAL
Qty: 12 | Refills: 0 | Status: DISCONTINUED | COMMUNITY
Start: 2022-12-02 | End: 2023-02-14

## 2023-02-14 RX ORDER — MELOXICAM 15 MG/1
15 TABLET ORAL
Qty: 30 | Refills: 0 | Status: DISCONTINUED | COMMUNITY
Start: 2022-11-01 | End: 2023-02-14

## 2023-02-14 RX ORDER — OXYCODONE 5 MG/1
5 TABLET ORAL 4 TIMES DAILY
Qty: 30 | Refills: 0 | Status: DISCONTINUED | COMMUNITY
Start: 2022-10-04 | End: 2023-02-14

## 2023-02-14 NOTE — HISTORY OF PRESENT ILLNESS
[FreeTextEntry1] : 23-year-old man right-handed accompanied by his wifeLast time seen in this office in May 2022.  Patient recently had left knee surgery approximately 6 months ago, improving.  Has noted persistent difficulty walking, some unsteadiness, 1 fall reported several weeks ago.  Finds his gait is somewhat slow, at times feel unsteady although denies any dizziness or lightheadedness.  No sense of the room spinning.  He also reports as he is walking some but he gets a sense that his body is speeding up unable to break himself, in fact that led to his last fall.\par Tends to fall forward and backwards.  No trouble eating, trouble swallowing, no significant change in weight, no constipation.\par Previous evaluation, including an MRI of the brain in 2019 showed evidence of atrophy, periventricular white matter changes and evidence of hydrocephalus.  Previously evaluated by neurosurgery for potential diagnosis of normal pressure hydrocephalus, but found not a candidate.

## 2023-02-14 NOTE — REVIEW OF SYSTEMS
[As Noted in HPI] : as noted in HPI [Difficulty Walking] : difficulty walking [Frequent Falls] : frequent falls [Arthralgias] : arthralgias [Joint Stiffness] : joint stiffness [Negative] : Heme/Lymph

## 2023-02-14 NOTE — PHYSICAL EXAM
[General Appearance - Alert] : alert [General Appearance - In No Acute Distress] : in no acute distress [Oriented To Time, Place, And Person] : oriented to person, place, and time [Impaired Insight] : insight and judgment were intact [Affect] : the affect was normal [Person] : oriented to person [Place] : oriented to place [Time] : oriented to time [Cranial Nerves Optic (II)] : visual acuity intact bilaterally,  visual fields full to confrontation, pupils equal round and reactive to light [Cranial Nerves Oculomotor (III)] : extraocular motion intact [Cranial Nerves Trigeminal (V)] : facial sensation intact symmetrically [Cranial Nerves Facial (VII)] : face symmetrical [Cranial Nerves Vestibulocochlear (VIII)] : hearing was intact bilaterally [Cranial Nerves Accessory (XI - Cranial And Spinal)] : head turning and shoulder shrug symmetric [Cranial Nerves Hypoglossal (XII)] : there was no tongue deviation with protrusion [Motor Strength] : muscle strength was normal in all four extremities [No Muscle Atrophy] : normal bulk in all four extremities [Motor Handedness Right-Handed] : the patient is right hand dominant [Paresis Pronator Drift Right-Sided] : no pronator drift on the right [Paresis Pronator Drift Left-Sided] : no pronator drift on the left [Sensation Tactile Decrease] : light touch was intact [Romberg's Sign] : Romberg's sign was negtive [Past-pointing] : there was no past-pointing [Tremor] : a tremor present [Dysdiadochokinesia Bilaterally] : not present [Coordination - Dysmetria Impaired Finger-to-Nose Bilateral] : not present [Coordination - Dysmetria Impaired Heel-to-Shin Bilateral] : not present [___] : absent on the right [___] : absent on the left [FreeTextEntry8] : postural tremor of the hands. mild cogwheeling of the bilateral upper extremity.  decreased arm swing

## 2023-02-14 NOTE — DISCUSSION/SUMMARY
[FreeTextEntry1] : 73-year-old man with history of a gait disorder, possible Parkinson disease.\par Plan: We will order a DaTscan\par We will refer to balance and gait retraining physical therapy.\par Return after the above.

## 2023-02-15 DIAGNOSIS — Z29.9 ENCOUNTER FOR PROPHYLACTIC MEASURES, UNSPECIFIED: ICD-10-CM

## 2023-02-15 DIAGNOSIS — Z98.890 OTHER SPECIFIED POSTPROCEDURAL STATES: Chronic | ICD-10-CM

## 2023-02-15 DIAGNOSIS — Z87.898 PERSONAL HISTORY OF OTHER SPECIFIED CONDITIONS: ICD-10-CM

## 2023-02-15 DIAGNOSIS — G45.9 TRANSIENT CEREBRAL ISCHEMIC ATTACK, UNSPECIFIED: ICD-10-CM

## 2023-02-15 DIAGNOSIS — D72.829 ELEVATED WHITE BLOOD CELL COUNT, UNSPECIFIED: ICD-10-CM

## 2023-02-15 DIAGNOSIS — Z86.69 PERSONAL HISTORY OF OTHER DISEASES OF THE NERVOUS SYSTEM AND SENSE ORGANS: ICD-10-CM

## 2023-02-15 DIAGNOSIS — N18.9 CHRONIC KIDNEY DISEASE, UNSPECIFIED: ICD-10-CM

## 2023-02-15 LAB
A1C WITH ESTIMATED AVERAGE GLUCOSE RESULT: 5.7 % — HIGH (ref 4–5.6)
ALBUMIN SERPL ELPH-MCNC: 3.6 G/DL — SIGNIFICANT CHANGE UP (ref 3.3–5)
ALP SERPL-CCNC: 63 U/L — SIGNIFICANT CHANGE UP (ref 40–120)
ALT FLD-CCNC: 32 U/L — SIGNIFICANT CHANGE UP (ref 12–78)
ANION GAP SERPL CALC-SCNC: 7 MMOL/L — SIGNIFICANT CHANGE UP (ref 5–17)
ANION GAP SERPL CALC-SCNC: 8 MMOL/L — SIGNIFICANT CHANGE UP (ref 5–17)
APPEARANCE UR: CLEAR — SIGNIFICANT CHANGE UP
APTT BLD: 27.2 SEC — LOW (ref 27.5–35.5)
AST SERPL-CCNC: 24 U/L — SIGNIFICANT CHANGE UP (ref 15–37)
BACTERIA # UR AUTO: ABNORMAL
BASOPHILS # BLD AUTO: 0.03 K/UL — SIGNIFICANT CHANGE UP (ref 0–0.2)
BASOPHILS NFR BLD AUTO: 0.3 % — SIGNIFICANT CHANGE UP (ref 0–2)
BILIRUB SERPL-MCNC: 0.5 MG/DL — SIGNIFICANT CHANGE UP (ref 0.2–1.2)
BILIRUB UR-MCNC: NEGATIVE — SIGNIFICANT CHANGE UP
BUN SERPL-MCNC: 23 MG/DL — SIGNIFICANT CHANGE UP (ref 7–23)
BUN SERPL-MCNC: 26 MG/DL — HIGH (ref 7–23)
CALCIUM SERPL-MCNC: 8.3 MG/DL — LOW (ref 8.5–10.1)
CALCIUM SERPL-MCNC: 8.7 MG/DL — SIGNIFICANT CHANGE UP (ref 8.5–10.1)
CHLORIDE SERPL-SCNC: 105 MMOL/L — SIGNIFICANT CHANGE UP (ref 96–108)
CHLORIDE SERPL-SCNC: 107 MMOL/L — SIGNIFICANT CHANGE UP (ref 96–108)
CHOLEST SERPL-MCNC: 122 MG/DL — SIGNIFICANT CHANGE UP
CO2 SERPL-SCNC: 24 MMOL/L — SIGNIFICANT CHANGE UP (ref 22–31)
CO2 SERPL-SCNC: 24 MMOL/L — SIGNIFICANT CHANGE UP (ref 22–31)
COLOR SPEC: YELLOW — SIGNIFICANT CHANGE UP
CREAT SERPL-MCNC: 1.2 MG/DL — SIGNIFICANT CHANGE UP (ref 0.5–1.3)
CREAT SERPL-MCNC: 1.3 MG/DL — SIGNIFICANT CHANGE UP (ref 0.5–1.3)
DIFF PNL FLD: ABNORMAL
EGFR: 58 ML/MIN/1.73M2 — LOW
EGFR: 64 ML/MIN/1.73M2 — SIGNIFICANT CHANGE UP
EOSINOPHIL # BLD AUTO: 0 K/UL — SIGNIFICANT CHANGE UP (ref 0–0.5)
EOSINOPHIL NFR BLD AUTO: 0 % — SIGNIFICANT CHANGE UP (ref 0–6)
EPI CELLS # UR: NEGATIVE — SIGNIFICANT CHANGE UP
ESTIMATED AVERAGE GLUCOSE: 117 MG/DL — HIGH (ref 68–114)
FLUAV AG NPH QL: SIGNIFICANT CHANGE UP
FLUBV AG NPH QL: SIGNIFICANT CHANGE UP
FOLATE SERPL-MCNC: 13.2 NG/ML — SIGNIFICANT CHANGE UP
GLUCOSE SERPL-MCNC: 110 MG/DL — HIGH (ref 70–99)
GLUCOSE SERPL-MCNC: 112 MG/DL — HIGH (ref 70–99)
GLUCOSE UR QL: NEGATIVE — SIGNIFICANT CHANGE UP
HCT VFR BLD CALC: 47.6 % — SIGNIFICANT CHANGE UP (ref 39–50)
HDLC SERPL-MCNC: 42 MG/DL — SIGNIFICANT CHANGE UP
HGB BLD-MCNC: 15.9 G/DL — SIGNIFICANT CHANGE UP (ref 13–17)
IMM GRANULOCYTES NFR BLD AUTO: 0.2 % — SIGNIFICANT CHANGE UP (ref 0–0.9)
INR BLD: 1.05 RATIO — SIGNIFICANT CHANGE UP (ref 0.88–1.16)
KETONES UR-MCNC: NEGATIVE — SIGNIFICANT CHANGE UP
LACTATE SERPL-SCNC: 1.7 MMOL/L — SIGNIFICANT CHANGE UP (ref 0.7–2)
LEUKOCYTE ESTERASE UR-ACNC: NEGATIVE — SIGNIFICANT CHANGE UP
LIPID PNL WITH DIRECT LDL SERPL: 66 MG/DL — SIGNIFICANT CHANGE UP
LYMPHOCYTES # BLD AUTO: 0.19 K/UL — LOW (ref 1–3.3)
LYMPHOCYTES # BLD AUTO: 1.7 % — LOW (ref 13–44)
MANUAL SMEAR VERIFICATION: YES — SIGNIFICANT CHANGE UP
MCHC RBC-ENTMCNC: 30.7 PG — SIGNIFICANT CHANGE UP (ref 27–34)
MCHC RBC-ENTMCNC: 33.4 GM/DL — SIGNIFICANT CHANGE UP (ref 32–36)
MCV RBC AUTO: 91.9 FL — SIGNIFICANT CHANGE UP (ref 80–100)
MONOCYTES # BLD AUTO: 0.32 K/UL — SIGNIFICANT CHANGE UP (ref 0–0.9)
MONOCYTES NFR BLD AUTO: 2.9 % — SIGNIFICANT CHANGE UP (ref 2–14)
NEUTROPHILS # BLD AUTO: 10.36 K/UL — HIGH (ref 1.8–7.4)
NEUTROPHILS NFR BLD AUTO: 94.9 % — HIGH (ref 43–77)
NITRITE UR-MCNC: NEGATIVE — SIGNIFICANT CHANGE UP
NON HDL CHOLESTEROL: 80 MG/DL — SIGNIFICANT CHANGE UP
NRBC # BLD: 0 /100 WBCS — SIGNIFICANT CHANGE UP (ref 0–0)
PH UR: 6 — SIGNIFICANT CHANGE UP (ref 5–8)
PLAT MORPH BLD: NORMAL — SIGNIFICANT CHANGE UP
PLATELET # BLD AUTO: 263 K/UL — SIGNIFICANT CHANGE UP (ref 150–400)
POTASSIUM SERPL-MCNC: 4.2 MMOL/L — SIGNIFICANT CHANGE UP (ref 3.5–5.3)
POTASSIUM SERPL-MCNC: 4.4 MMOL/L — SIGNIFICANT CHANGE UP (ref 3.5–5.3)
POTASSIUM SERPL-SCNC: 4.2 MMOL/L — SIGNIFICANT CHANGE UP (ref 3.5–5.3)
POTASSIUM SERPL-SCNC: 4.4 MMOL/L — SIGNIFICANT CHANGE UP (ref 3.5–5.3)
PROT SERPL-MCNC: 7.5 G/DL — SIGNIFICANT CHANGE UP (ref 6–8.3)
PROT UR-MCNC: 15
PROTHROM AB SERPL-ACNC: 12.3 SEC — SIGNIFICANT CHANGE UP (ref 10.5–13.4)
RBC # BLD: 5.18 M/UL — SIGNIFICANT CHANGE UP (ref 4.2–5.8)
RBC # FLD: 14.2 % — SIGNIFICANT CHANGE UP (ref 10.3–14.5)
RBC BLD AUTO: SIGNIFICANT CHANGE UP
RBC CASTS # UR COMP ASSIST: SIGNIFICANT CHANGE UP /HPF (ref 0–4)
RSV RNA NPH QL NAA+NON-PROBE: SIGNIFICANT CHANGE UP
SARS-COV-2 RNA SPEC QL NAA+PROBE: SIGNIFICANT CHANGE UP
SODIUM SERPL-SCNC: 137 MMOL/L — SIGNIFICANT CHANGE UP (ref 135–145)
SODIUM SERPL-SCNC: 138 MMOL/L — SIGNIFICANT CHANGE UP (ref 135–145)
SP GR SPEC: 1.01 — SIGNIFICANT CHANGE UP (ref 1.01–1.02)
TRIGL SERPL-MCNC: 68 MG/DL — SIGNIFICANT CHANGE UP
TSH SERPL-MCNC: 0.23 UIU/ML — LOW (ref 0.36–3.74)
TSH SERPL-MCNC: 0.26 UIU/ML — LOW (ref 0.36–3.74)
UROBILINOGEN FLD QL: NEGATIVE — SIGNIFICANT CHANGE UP
VIT B12 SERPL-MCNC: 513 PG/ML — SIGNIFICANT CHANGE UP (ref 232–1245)
WBC # BLD: 10.92 K/UL — HIGH (ref 3.8–10.5)
WBC # FLD AUTO: 10.92 K/UL — HIGH (ref 3.8–10.5)
WBC UR QL: SIGNIFICANT CHANGE UP

## 2023-02-15 PROCEDURE — 70450 CT HEAD/BRAIN W/O DYE: CPT | Mod: 26,76

## 2023-02-15 PROCEDURE — 71250 CT THORAX DX C-: CPT | Mod: 26,MA

## 2023-02-15 PROCEDURE — 93306 TTE W/DOPPLER COMPLETE: CPT | Mod: 26

## 2023-02-15 PROCEDURE — 70544 MR ANGIOGRAPHY HEAD W/O DYE: CPT | Mod: 26,59

## 2023-02-15 PROCEDURE — 70551 MRI BRAIN STEM W/O DYE: CPT | Mod: 26

## 2023-02-15 PROCEDURE — 74176 CT ABD & PELVIS W/O CONTRAST: CPT | Mod: 26

## 2023-02-15 PROCEDURE — 93010 ELECTROCARDIOGRAM REPORT: CPT | Mod: 76

## 2023-02-15 PROCEDURE — 70549 MR ANGIOGRAPH NECK W/O&W/DYE: CPT | Mod: 26

## 2023-02-15 PROCEDURE — 99223 1ST HOSP IP/OBS HIGH 75: CPT | Mod: GC

## 2023-02-15 RX ORDER — ASPIRIN/CALCIUM CARB/MAGNESIUM 324 MG
81 TABLET ORAL DAILY
Refills: 0 | Status: DISCONTINUED | OUTPATIENT
Start: 2023-02-15 | End: 2023-02-16

## 2023-02-15 RX ORDER — SODIUM CHLORIDE 9 MG/ML
1000 INJECTION, SOLUTION INTRAVENOUS
Refills: 0 | Status: DISCONTINUED | OUTPATIENT
Start: 2023-02-15 | End: 2023-02-15

## 2023-02-15 RX ORDER — ATORVASTATIN CALCIUM 80 MG/1
80 TABLET, FILM COATED ORAL AT BEDTIME
Refills: 0 | Status: DISCONTINUED | OUTPATIENT
Start: 2023-02-15 | End: 2023-02-16

## 2023-02-15 RX ORDER — SODIUM CHLORIDE 9 MG/ML
1000 INJECTION INTRAMUSCULAR; INTRAVENOUS; SUBCUTANEOUS
Refills: 0 | Status: DISCONTINUED | OUTPATIENT
Start: 2023-02-15 | End: 2023-02-15

## 2023-02-15 RX ORDER — ASPIRIN/CALCIUM CARB/MAGNESIUM 324 MG
325 TABLET ORAL ONCE
Refills: 0 | Status: DISCONTINUED | OUTPATIENT
Start: 2023-02-15 | End: 2023-02-15

## 2023-02-15 RX ORDER — ONDANSETRON 8 MG/1
4 TABLET, FILM COATED ORAL ONCE
Refills: 0 | Status: COMPLETED | OUTPATIENT
Start: 2023-02-15 | End: 2023-02-15

## 2023-02-15 RX ORDER — HEPARIN SODIUM 5000 [USP'U]/ML
5000 INJECTION INTRAVENOUS; SUBCUTANEOUS EVERY 8 HOURS
Refills: 0 | Status: DISCONTINUED | OUTPATIENT
Start: 2023-02-15 | End: 2023-02-15

## 2023-02-15 RX ADMIN — SODIUM CHLORIDE 1000 MILLILITER(S): 9 INJECTION INTRAMUSCULAR; INTRAVENOUS; SUBCUTANEOUS at 00:13

## 2023-02-15 RX ADMIN — ONDANSETRON 4 MILLIGRAM(S): 8 TABLET, FILM COATED ORAL at 06:57

## 2023-02-15 RX ADMIN — ATORVASTATIN CALCIUM 80 MILLIGRAM(S): 80 TABLET, FILM COATED ORAL at 21:54

## 2023-02-15 RX ADMIN — SODIUM CHLORIDE 75 MILLILITER(S): 9 INJECTION INTRAMUSCULAR; INTRAVENOUS; SUBCUTANEOUS at 06:57

## 2023-02-15 RX ADMIN — SODIUM CHLORIDE 75 MILLILITER(S): 9 INJECTION, SOLUTION INTRAVENOUS at 12:16

## 2023-02-15 RX ADMIN — Medication 81 MILLIGRAM(S): at 12:27

## 2023-02-15 RX ADMIN — Medication 650 MILLIGRAM(S): at 00:13

## 2023-02-15 NOTE — H&P ADULT - NSHPSOCIALHISTORY_GEN_ALL_CORE
Tobacco: denies   EtOH: social   Recreational drug use: denies   Lives with: wife   Ambulates: independent   ADLs: independent

## 2023-02-15 NOTE — PROGRESS NOTE ADULT - SUBJECTIVE AND OBJECTIVE BOX
Patient is a 73y old  Male who presents with a chief complaint of TIA workup (15 Feb 2023 04:41)      Subjective:  INTERVAL HPI/OVERNIGHT EVENTS: Patient seen and examined at bedside.  Patient states he is feeling much better now   MEDICATIONS  (STANDING):  aspirin  chewable 81 milliGRAM(s) Oral daily  atorvastatin 80 milliGRAM(s) Oral at bedtime  dextrose 5% + lactated ringers. 1000 milliLiter(s) (75 mL/Hr) IV Continuous <Continuous>    MEDICATIONS  (PRN):      Allergies    No Known Drug Allergies  shellfish (Anaphylaxis)    Intolerances        REVIEW OF SYSTEMS:  CONSTITUTIONAL: No fever or chills  HEENT:  No headache, no sore throat  RESPIRATORY: No cough or shortness of breath  CARDIOVASCULAR: No chest pain or palpitations  GASTROINTESTINAL: No abd pain, nausea, vomiting, or diarrhea      Objective:  Vital Signs Last 24 Hrs  T(C): 37.3 (15 Feb 2023 10:15), Max: 37.9 (2023 23:07)  T(F): 99.2 (15 Feb 2023 10:15), Max: 100.2 (2023 23:07)  HR: 78 (15 Feb 2023 10:15) (70 - 91)  BP: 112/73 (15 Feb 2023 10:15) (112/73 - 137/72)  BP(mean): --  RR: 16 (15 Feb 2023 10:15) (16 - 18)  SpO2: 93% (15 Feb 2023 10:15) (93% - 96%)    Parameters below as of 15 Feb 2023 10:15  Patient On (Oxygen Delivery Method): room air        GENERAL: NAD, lying in bed comfortably  HEAD:  Normocephalic  EYES:  conjunctiva and sclera clear  ENT: Moist mucous membranes, mild flatten nasal crease noticed    NECK: Supple  CHEST/LUNG: Clear to auscultation bilaterally; No rales or rhonchi; no wheezing. Unlabored respirations  HEART: Regular rate and rhythm; S1S2+  ABDOMEN: Bowel sounds present; Soft, Nontender, Nondistended.   EXTREMITIES:  + distal Peripheral Pulses;  No cyanosis, or edema  NERVOUS SYSTEM:  Alert & Oriented X3;  No gross focal deficits   MSK: moves all extremities  SKIN: No rashes     LABS:                        15.9   10.92 )-----------( 263      ( 15 Feb 2023 00:06 )             47.6     15 Feb 2023 05:48    138    |  107    |  23     ----------------------------<  112    4.2     |  24     |  1.20     Ca    8.3        15 Feb 2023 05:48    TPro  7.5    /  Alb  3.6    /  TBili  0.5    /  DBili  x      /  AST  24     /  ALT  32     /  AlkPhos  63     15 Feb 2023 00:06    PT/INR - ( 15 Feb 2023 00:06 )   PT: 12.3 sec;   INR: 1.05 ratio         PTT - ( 15 Feb 2023 00:06 )  PTT:27.2 sec  Urinalysis Basic - ( 15 Feb 2023 01:50 )    Color: Yellow / Appearance: Clear / S.015 / pH: x  Gluc: x / Ketone: Negative  / Bili: Negative / Urobili: Negative   Blood: x / Protein: 15 / Nitrite: Negative   Leuk Esterase: Negative / RBC: 0-2 /HPF / WBC 0-2   Sq Epi: x / Non Sq Epi: Negative / Bacteria: Occasional      CAPILLARY BLOOD GLUCOSE              RADIOLOGY & ADDITIONAL TESTS:    Personally reviewed.     Consultant(s) Notes Reviewed:  [x] YES  [ ] NO    Plan of care discussed with patient /family; all questions answered

## 2023-02-15 NOTE — PHYSICAL THERAPY INITIAL EVALUATION ADULT - PERTINENT HX OF CURRENT PROBLEM, REHAB EVAL
73 years old male patient with PMHx of Left knee replacement, Intentional tremor, ?normal pressure hydrocephalus syndrome?, restless leg, imbalance brought in today for AMS. Patient's wife is at bedside, who states that patient was acting "off" today following a doctor's appointment. Patient was sitting in the den with a blank stare, and had trouble getting out of his chair. He was very unsteady, and she aided him to the bathroom. He vomited, non-bilious non-bloody. He urinated, and then could not get up from the toilet. Eventually he got up with her help, and she brought him back to the den. At this point, he urinated on himself. Wife notes he did not have any garbled or slurred speech and that he could speak in full sentences, however he was very off and seemed to be "in a haze." It was at this point she brought him to the ED for evaluation. Patient states the events of the day are somewhat difficult to recall, but he recalls being unsteady on his feet Of note, per chart review patient saw his neurologist Dr. Cherry on 02/14/2023 for persistent imbalance and unsteadiness, possible parkinson's disease, was ordered DaTscan. CT abdomen; No acute CT findings, CT Head; No evidence of acute intracranial hemorrhage,   midline shift or CT evidence of acute territorial infarct.

## 2023-02-15 NOTE — H&P ADULT - PROBLEM SELECTOR PLAN 2
-  MRI of the brain in 2019 showed evidence of atrophy, periventricular white matter changes and evidence of hydrocephalus. Previously evaluated by neurosurgery for potential diagnosis of normal pressure hydrocephalus, but found not a candidate.   - Fall precautions -  MRI of the brain in 2019 showed evidence of atrophy, periventricular white matter changes and evidence of hydrocephalus. Previously evaluated by neurosurgery for potential diagnosis of normal pressure hydrocephalus, but found not a candidate.   - Fall precautions  - neurology consulted

## 2023-02-15 NOTE — H&P ADULT - PROBLEM SELECTOR PLAN 5
- Creatinine 1.3 on admission. Baseline creatinine 1.35 (08/2022)  - Monitor BMP  - Avoid nephrotoxic medications  - Monitor renal indices

## 2023-02-15 NOTE — H&P ADULT - PROBLEM SELECTOR PLAN 3
Patient saw his neurologist Dr. Cherry on 02/15/2023 for persistent imbalance and unsteadiness, possible parkinson's disease, was order DaTscan.   - Neurology (Dr. Green) consulted, f/u recs  - Fall precautions   - PT/OT consulted Patient saw his neurologist Dr. Cherry on 02/14/2023 for persistent imbalance and unsteadiness, possible parkinson's disease, was order DaTscan.   - Neurology (Dr. Green) consulted, f/u recs  - Fall precautions   - PT/OT consulted

## 2023-02-15 NOTE — H&P ADULT - HISTORY OF PRESENT ILLNESS
73 years old male patient with PMHx of Left knee replacement, Intentional tremor, normal pressure hydrocephalus syndrome, restless leg, imbalance brought in today for AMS.   Per chart review patient saw his neurologist Dr. Cherry on 02/15/2023 for persistent imbalance and unsteadiness, possible parkinson's disease, was order DaTscan.     ED course:   VS: T-max 100.2 F HR: 91 BP : 119/80 SpO2: 96% RA RR: 18  Labs significant for Leukocytosis of 10.9 K with neutrophile 94.9%, BUN/creatinine: 26/1.30  EKG shows---  CT head shows Motion limited study. No evidence of acute intracranial hemorrhage,   midline shift or CT evidence of acute territorial infarct. The ventricular system is mildly dilated out of proportion to the surrounding sulci.   CT chest No airspace consolidation.  Given Tylenol 650 mg PO x1, NS 1 Lt x1 in the ED    73 years old male patient with PMHx of Left knee replacement, Intentional tremor, normal pressure hydrocephalus syndrome, restless leg, imbalance brought in today for AMS, patient's wife at bedside who states that patient   Per chart review patient saw his neurologist Dr. Cherry on 02/15/2023 for persistent imbalance and unsteadiness, possible parkinson's disease, was order DaTscan.     ED course:   VS: T-max 100.2 F HR: 91 BP : 119/80 SpO2: 96% RA RR: 18  Labs significant for Leukocytosis of 10.9 K with neutrophile 94.9%, BUN/creatinine: 26/1.30  EKG shows NS @ 78, QT/QTc: 366/417 ms   CT head shows Motion limited study. No evidence of acute intracranial hemorrhage, midline shift or CT evidence of acute territorial infarct. The ventricular system is mildly dilated out of proportion to the surrounding sulci.   CT chest No airspace consolidation.  Given Tylenol 650 mg PO x1, NS 1 Lt x1 in the ED    73 years old male patient with PMHx of Left knee replacement, Intentional tremor, normal pressure hydrocephalus syndrome, restless leg, imbalance brought in today for AMS. Patient's wife is at bedside, who states that patient was acting "off" today following a doctor's appointment. Patient was sitting in the den with a blank stare, and had trouble getting out of his chair. He was very unsteady, and she aided him to the bathroom. He urinated, and then could not get up from the toilet. Eventually he got up with her help, and she brought him back to the den. At this point, he urinated on himself. Wife notes he did not have any garbled or slurred speech and that he could speak in full sentences, however he was very off and seemed to be "in a haze." It was at this point she brought him to the ED for evaluation. Patient states the events of the day are somewhat difficult to recall, but he recalls being unsteady on his feet.    Of note, per chart review patient saw his neurologist Dr. Cherry on 02/14/2023 for persistent imbalance and unsteadiness, possible parkinson's disease, was ordered DaTscan. He has also been worked up for possible normal pressure hydrocephalus.    ED course:   VS: T-max 100.2 F HR: 91 BP : 119/80 SpO2: 96% RA RR: 18  Labs significant for Leukocytosis of 10.9 K with neutrophile 94.9%, BUN/creatinine: 26/1.30  EKG shows NS @ 78, QT/QTc: 366/417 ms   CT head shows Motion limited study. No evidence of acute intracranial hemorrhage, midline shift or CT evidence of acute territorial infarct. The ventricular system is mildly dilated out of proportion to the surrounding sulci.   CT chest No airspace consolidation.  Given Tylenol 650 mg PO x1, NS 1 Lt x1 in the ED    73 years old male patient with PMHx of Left knee replacement, Intentional tremor, normal pressure hydrocephalus syndrome, restless leg, imbalance brought in today for AMS. Patient's wife is at bedside, who states that patient was acting "off" today following a doctor's appointment. Patient was sitting in the den with a blank stare, and had trouble getting out of his chair. He was very unsteady, and she aided him to the bathroom. He vomited, non-bilious non-bloody. He urinated, and then could not get up from the toilet. Eventually he got up with her help, and she brought him back to the den. At this point, he urinated on himself. Wife notes he did not have any garbled or slurred speech and that he could speak in full sentences, however he was very off and seemed to be "in a haze." It was at this point she brought him to the ED for evaluation. Patient states the events of the day are somewhat difficult to recall, but he recalls being unsteady on his feet.    Of note, per chart review patient saw his neurologist Dr. Cherry on 02/14/2023 for persistent imbalance and unsteadiness, possible parkinson's disease, was ordered DaTscan. He has also been worked up for possible normal pressure hydrocephalus.    ED course:   VS: T-max 100.2 F HR: 91 BP : 119/80 SpO2: 96% RA RR: 18  Labs significant for Leukocytosis of 10.9 K with neutrophile 94.9%, BUN/creatinine: 26/1.30  EKG shows NS @ 78, QT/QTc: 366/417 ms   CT head shows Motion limited study. No evidence of acute intracranial hemorrhage, midline shift or CT evidence of acute territorial infarct. The ventricular system is mildly dilated out of proportion to the surrounding sulci.   CT chest No airspace consolidation.  Given Tylenol 650 mg PO x1, NS 1 Lt x1 in the ED    73 years old male patient with PMHx of Left knee replacement, Intentional tremor, ?normal pressure hydrocephalus syndrome?, restless leg, imbalance brought in today for AMS. Patient's wife is at bedside, who states that patient was acting "off" today following a doctor's appointment. Patient was sitting in the den with a blank stare, and had trouble getting out of his chair. He was very unsteady, and she aided him to the bathroom. He vomited, non-bilious non-bloody. He urinated, and then could not get up from the toilet. Eventually he got up with her help, and she brought him back to the den. At this point, he urinated on himself. Wife notes he did not have any garbled or slurred speech and that he could speak in full sentences, however he was very off and seemed to be "in a haze." It was at this point she brought him to the ED for evaluation. Patient states the events of the day are somewhat difficult to recall, but he recalls being unsteady on his feet.  Of note, per chart review patient saw his neurologist Dr. Cherry on 02/14/2023 for persistent imbalance and unsteadiness, possible parkinson's disease, was ordered DaTscan. He has also been worked up for possible normal pressure hydrocephalus however diagnosis has not yet been made.    ED course:   VS: T-max 100.2 F HR: 91 BP : 119/80 SpO2: 96% RA RR: 18  Labs significant for Leukocytosis of 10.9 K with neutrophile 94.9%, BUN/creatinine: 26/1.30  EKG shows NS @ 78, QT/QTc: 366/417 ms   CT head shows Motion limited study. No evidence of acute intracranial hemorrhage, midline shift or CT evidence of acute territorial infarct. The ventricular system is mildly dilated out of proportion to the surrounding sulci.   CT chest No airspace consolidation.  Given Tylenol 650 mg PO x1, NS 1 Lt x1 in the ED

## 2023-02-15 NOTE — H&P ADULT - NSICDXFAMILYHX_GEN_ALL_CORE_FT
FAMILY HISTORY:  Father  Still living? No  FH: dementia, Age at diagnosis: Age Unknown    Mother  Still living? Unknown  FH: dementia, Age at diagnosis: Age Unknown

## 2023-02-15 NOTE — ED ADULT NURSE REASSESSMENT NOTE - NS ED NURSE REASSESS COMMENT FT1
Report received at 0710.  Patient out of the unit for a test.  Night RN notified tele tech that patient is off unit.  Will assess whrn the patient returns.
Patient returned to unit, patient is on cardiac monitor, sinus rhythm with pvcs.  Patient states he is feeling better than when he came in.  he Is alert and oriented x 4.  IV fluids infusing to left arm IV site is benign.  Patient denies needs at this time, call bell is within reach.

## 2023-02-15 NOTE — H&P ADULT - PROBLEM SELECTOR PLAN 4
Leukocytosis of 10.9 K with neutrophile 94.9%, on admission, T-max 100.2 F. Unclear etiology, no evidence of acute infectious process   - Leukocytosis of 10.9 K with neutrophile 94.9%, on admission, T-max 100.2 F.  - F/up urine and blood cultures   - Will monitor off antibiotics for now given low suspicion of bacterial infection   - Trend CBC with diff and temp

## 2023-02-15 NOTE — ED PROVIDER NOTE - CLINICAL SUMMARY MEDICAL DECISION MAKING FREE TEXT BOX
73 year old male p/w an episodes of weakness, vomiting, fever, confusion, and gait disturbance that occurred 6 hours PTA and has largely resolved.  Patient under the care of neuro for a work up of gait disturbance/possible Parkinson's  Noted to be febrile in ED.  Check labs, lactate, Blood and Urine cultures, CXR, CT head, EKG.  Consider admission for TIA and neuro evaluation

## 2023-02-15 NOTE — H&P ADULT - NSHPPHYSICALEXAM_GEN_ALL_CORE
T(C): 37.2 (02-15-23 @ 04:21), Max: 37.9 (02-14-23 @ 23:07)  HR: 81 (02-15-23 @ 04:21) (81 - 91)  BP: 137/72 (02-15-23 @ 04:21) (119/80 - 137/72)  RR: 18 (02-15-23 @ 04:21) (18 - 18)  SpO2: 94% (02-15-23 @ 04:21) (94% - 96%)    GENERAL: patient appears well, no acute distress, appropriate, pleasant  ENMT: oropharynx clear without erythema, no exudates, moist mucous membranes  LUNGS: good air entry bilaterally, clear to auscultation, symmetric breath sounds, no wheezing or rhonchi appreciated  HEART: soft S1/S2, regular rate and rhythm, no murmurs noted, no lower extremity edema  GASTROINTESTINAL: abdomen is soft, nontender, nondistended, normoactive bowel sounds, no palpable masses  INTEGUMENT: good skin turgor, no lesions noted  MUSCULOSKELETAL: no clubbing or cyanosis, no obvious deformity  NEUROLOGIC: awake, alert, oriented x3, slow speech and though process,  no obvious sensory deficits  PSYCHIATRIC: mood is good, slow though process T(C): 37.2 (02-15-23 @ 04:21), Max: 37.9 (02-14-23 @ 23:07)  HR: 81 (02-15-23 @ 04:21) (81 - 91)  BP: 137/72 (02-15-23 @ 04:21) (119/80 - 137/72)  RR: 18 (02-15-23 @ 04:21) (18 - 18)  SpO2: 94% (02-15-23 @ 04:21) (94% - 96%)    GENERAL: patient appears well, no acute distress, appropriate, pleasant  ENMT: oropharynx clear without erythema, no exudates, moist mucous membranes  LUNGS: good air entry bilaterally, clear to auscultation, symmetric breath sounds, no wheezing or rhonchi appreciated  HEART: soft S1/S2, regular rate and rhythm, no murmurs noted, no lower extremity edema  GASTROINTESTINAL: abdomen is soft, nontender, nondistended, normoactive bowel sounds, no palpable masses  INTEGUMENT: good skin turgor, no lesions noted  MUSCULOSKELETAL: no clubbing or cyanosis, no obvious deformity  NEUROLOGIC: awake, alert, oriented x3, EOMI, PERRLA, CN II-XII intact, strength 4/4 in all extremities, slow speech and thought process,  no obvious sensory deficits  PSYCHIATRIC: mood is good, slow thought process T(C): 37.2 (02-15-23 @ 04:21), Max: 37.9 (02-14-23 @ 23:07)  HR: 81 (02-15-23 @ 04:21) (81 - 91)  BP: 137/72 (02-15-23 @ 04:21) (119/80 - 137/72)  RR: 18 (02-15-23 @ 04:21) (18 - 18)  SpO2: 94% (02-15-23 @ 04:21) (94% - 96%)  GENERAL: patient appears well, no acute distress, appropriate, pleasant  ENMT: oropharynx clear without erythema, no exudates, moist mucous membranes  LUNGS: good air entry bilaterally, clear to auscultation, symmetric breath sounds, no wheezing or rhonchi appreciated  HEART: soft S1/S2, regular rate and rhythm, no murmurs noted, no lower extremity edema  GASTROINTESTINAL: abdomen is soft, nontender, nondistended, normoactive bowel sounds, no palpable masses  INTEGUMENT: good skin turgor, no lesions noted  MUSCULOSKELETAL: no clubbing or cyanosis, no obvious deformity  NEUROLOGIC: awake, alert, oriented x3, EOMI, PERRLA, CN II-XII intact, strength 4/4 in all extremities, slow speech and thought process,  no obvious sensory deficits  PSYCHIATRIC: mood is good, slow thought process

## 2023-02-15 NOTE — PHYSICAL THERAPY INITIAL EVALUATION ADULT - TRANSFER TRAINING, PT EVAL
STG (3-5 sessions) pt able to complete functional transfers independently with rolling walker to perform tasks at home safely

## 2023-02-15 NOTE — H&P ADULT - ASSESSMENT
73 years old male patient with PMHx of Left knee replacement, Intentional tremor, normal pressure hydrocephalus syndrome, restless leg, imbalance brought in today for AMS.   Per chart review patient saw his neurologist Dr. Cherry on 02/15/2023 for persistent imbalance and unsteadiness, possible parkinson's disease, was order DaTscan.     ED course:   VS: T-max 100.2 F HR: 91 BP : 119/80 SpO2: 96% RA RR: 18  Labs significant for Leukocytosis of 10.9 K with neutrophile 94.9%, BUN/creatinine: 26/1.30  EKG shows---  CT head shows Motion limited study. No evidence of acute intracranial hemorrhage,   midline shift or CT evidence of acute territorial infarct. The ventricular system is mildly dilated out of proportion to the surrounding sulci.   CT chest No airspace consolidation.  Given Tylenol 650 mg PO x1, NS 1 Lt x1 in the ED  73 years old male patient with PMHx of Left knee replacement, Intentional tremor, normal pressure hydrocephalus syndrome, restless leg, imbalance brought in today for AMS. Per chart review patient saw his neurologist Dr. Cherry on 02/15/2023 for persistent imbalance and unsteadiness, possible parkinson's disease. Patient admitted for TIA work up.

## 2023-02-15 NOTE — H&P ADULT - NSICDXPASTMEDICALHX_GEN_ALL_CORE_FT
PAST MEDICAL HISTORY:  H/O hydrocephalus     HLD (hyperlipidemia)     Restless leg     Tremor     Unstable gait

## 2023-02-15 NOTE — ED PROVIDER NOTE - CONSTITUTIONAL, MLM
normal... Well appearing, awake, alert, oriented to person, place, time/situation and in no apparent distress.  Confused at times, cooperative for exam

## 2023-02-15 NOTE — H&P ADULT - ATTENDING COMMENTS
73 years old male patient with PMHx of Left knee replacement, Intentional tremor, normal pressure hydrocephalus syndrome, restless leg, imbalance brought in today for AMS. Per chart review patient saw his neurologist Dr. Cherry on 02/15/2023 for persistent imbalance and unsteadiness, possible parkinson's disease. Patient admitted for TIA work up.     Agree with above. Edited where appropriate.     Upon my evaluation patient with copious amounts of projectile vomiting. Non bloody. Patient states he was not nauseous and that vomitus "came out of no where". Approx 500cc noted. Patient also now complaining of 5/10 headache. Will order STAT repeat CT head to evaluate for possible delayed bleed, will also check ct abdomen/pelvis to assess abdominal source of symptoms. It is likely that patients symptoms are consistent with NPH, per neurology outpatient notes he has been assessed by neurosurgery for NPH and deemed not a candidate.

## 2023-02-15 NOTE — PATIENT PROFILE ADULT - FUNCTIONAL ASSESSMENT - BASIC MOBILITY 6.
2-calculated by average/Not able to assess (calculate score using [facility] averaging method)

## 2023-02-15 NOTE — H&P ADULT - PROBLEM SELECTOR PLAN 1
Patient presents  AMS, need to rule out  TIA vs. r/o CVA  - Admit to telemetry   - CT head shows Motion limited study. No evidence of acute intracranial hemorrhage,   midline shift or CT evidence of acute territorial infarct. The ventricular system is mildly dilated out of proportion to the surrounding sulci.   - Aspirin 81 mg daily, Lipitor 80 mg qhs  - Lipid profile, A1C, TSH  - Neuro checks q4h  - Permissive HTN for 24 hours   - Telemetry monitoring to r/o arrhythmia   - Bedside dyphagia screen by RN followed by formal speech evaluation if necessary  - Repeat CT head in 24 hours (or sooner if change in neuro status)  - Fall and aspiration precautions  - F/u TTE  - F/u MRI head  - Neurology (Dr. Green) consulted, f/u recs  - PT/OT evaluation Patient presents  AMS, need to rule out  TIA vs. r/o CVA  - Admit to telemetry   - CT head shows Motion limited study. No evidence of acute intracranial hemorrhage,   midline shift or CT evidence of acute territorial infarct. The ventricular system is mildly dilated out of proportion to the surrounding sulci.   - Aspirin 81 mg daily, Lipitor 80 mg qhs  - Lipid profile, A1C, TSH  - Neuro checks q4h  - Permissive HTN for 24 hours   - Telemetry monitoring to r/o arrhythmia   - Fall and aspiration precautions  - F/u TTE  - F/u MRI head  - Neurology (Dr. Green) consulted, f/u recs  - PT/OT evaluation

## 2023-02-15 NOTE — ED PROVIDER NOTE - DIFFERENTIAL DIAGNOSIS
Differential Diagnosis DDx including but not limited to sepsis, COVID, flu, PNA, UTI, CVA, TIA, ICH, seizure

## 2023-02-15 NOTE — PROGRESS NOTE ADULT - PROBLEM SELECTOR PLAN 4
Unclear etiology, no evidence of acute infectious process   - Leukocytosis of 10.9 K with neutrophile 94.9%, on admission, T-max 100.2 F.  - F/up urine and blood cultures   - Will monitor off antibiotics for now given low suspicion of bacterial infection   - Trend CBC with diff and temp Unclear etiology, no evidence of acute infectious process   - Leukocytosis of 10.9 K with neutrophile 94.9%, on admission, T-max 100.2 F.  - F/up urine and blood cultures   - Will monitor off antibiotics for now given low suspicion of bacterial infection   - Trend CBC with diff and temp  CT abd: < from: CT Abdomen and Pelvis No Cont (02.15.23 @ 06:53) >    No acute CT findings.    < end of copied text >

## 2023-02-15 NOTE — ED ADULT NURSE NOTE - OBJECTIVE STATEMENT
Pt BIBEMS for generalized weakness, fever, vomiting and difficulty walking.  Denies any chest pain or SOB.  No diarrhea.  Pt given tylenol for fever.  Wife at bedside.  No neurological deficits noted.  Pt ambulated to wheelchair for CT scan, slightly unsteady gait noted; stand by/1 assist needed.  Maintain comfort and safety.

## 2023-02-15 NOTE — SWALLOW BEDSIDE ASSESSMENT ADULT - COMMENTS
IMPRESSION:  CT Head; No evidence of acute intracranial hemorrhage, midline shift or CT evidence of acute territorial infarct  CXR - No radiographic evidence of active cardiopulmonary disease.    per charting - 73 years old male patient with PMHx of Left knee replacement, Intentional tremor, ?normal pressure hydrocephalus syndrome?, restless leg, imbalance brought in today for AMS. Patient's wife is at bedside, who states that patient was acting "off" today following a doctor's appointment. Patient was sitting in the den with a blank stare, and had trouble getting out of his chair. He was very unsteady, and she aided him to the bathroom. He vomited, non-bilious non-bloody. He urinated, and then could not get up from the toilet. Eventually he got up with her help, and she brought him back to the den. At this point, he urinated on himself. Wife notes he did not have any garbled or slurred speech and that he could speak in full sentences, however he was very off and seemed to be "in a haze." It was at this point she brought him to the ED for evaluation. Patient states the events of the day are somewhat difficult to recall, but he recalls being unsteady on his feet.  Of note, per chart review patient saw his neurologist Dr. Cherry on 02/14/2023 for persistent imbalance and unsteadiness, possible parkinson's disease, was ordered DaTscan. He has also been worked up for possible normal pressure hydrocephalus however diagnosis has not yet been made.    Order received and chart reviewed. Patient is received in bed,NAD, AAOX3, follows commands, speech slow at times but intelligible and communication is functional. Pt denies difficulty swallowing and reports he is feeling better.

## 2023-02-15 NOTE — CASE MANAGEMENT PROGRESS NOTE - NSCMPROGRESSNOTE_GEN_ALL_CORE
Pt admitted to unit from ED, spoke with PT, no skilled PT needs, pt has a RW at home.  CM will follow.

## 2023-02-15 NOTE — PHYSICAL THERAPY INITIAL EVALUATION ADULT - ADDITIONAL COMMENTS
Pt lives in private residence with spouse, 5 BONG, 15 steps to bedroom on second level. Pt has access to bathroom on both levels. Pt has stall shower. pt states pta he was ambulating and completing ADLs independently without device. Pt has a RW at home but does not use it. +

## 2023-02-15 NOTE — SWALLOW BEDSIDE ASSESSMENT ADULT - ASR SWALLOW RECOMMEND DIAG
Will follow at bedside to determine diet tolerance/need for MBSS. Not indicated at this time as patient presents with grossly functional pharyngeal swallow and no concern for PNA per charting

## 2023-02-15 NOTE — ED PROVIDER NOTE - OBJECTIVE STATEMENT
73 year old male with a history of HLD presents with weakness. vomiting, fever, gait disturbance.  Patient BIBA, lives at home, history provided by wife at bedside.  Wife states patient has been under the care of neurology for the past year, he is being worked up for possible Parkinson's disease due to a very rapid and uncontrolled gait.  He was seen at the neurologist's office this afternoon and appeared in his usual state of health.  Around 5pm, wife noted that patient appeared weak, refused to walk and was unable to get up from his chair.  He vomited twice and wife noted he felt warm. Temp was 99.  Wife then states that patient wet himself and his eyes appeared to "glaze over" and he was confused. States he was not himself.  Patient offers no complaints, but is a poor historian.  PMD Dr. Hall, Neurology Oscar Maynard 73 year old male with a history of HLD presents with weakness. vomiting, fever, gait disturbance.  Patient BIBA, lives at home, history provided by wife at bedside.  Wife states patient has been under the care of neurology for the past year, he is being worked up for possible Parkinson's disease due to a very rapid and uncontrolled gait.  He was seen at the neurologist's office this afternoon and appeared in his usual state of health.  Around 5pm, wife noted that patient appeared weak, refused to walk and was unable to get up from his chair.  He vomited twice and wife noted he felt warm. Temp was 99.  Wife then states that patient wet himself and his eyes appeared to "glaze over" and he was confused/unable to get words out. States he was not himself.  Patient offers no complaints, but is a poor historian.  PMD Dr. Hall, Neurology Oscar Maynard

## 2023-02-16 ENCOUNTER — TRANSCRIPTION ENCOUNTER (OUTPATIENT)
Age: 73
End: 2023-02-16

## 2023-02-16 LAB
ALBUMIN SERPL ELPH-MCNC: 3.3 G/DL — SIGNIFICANT CHANGE UP (ref 3.3–5)
ANION GAP SERPL CALC-SCNC: 6 MMOL/L — SIGNIFICANT CHANGE UP (ref 5–17)
BASOPHILS # BLD AUTO: 0.03 K/UL — SIGNIFICANT CHANGE UP (ref 0–0.2)
BASOPHILS NFR BLD AUTO: 0.4 % — SIGNIFICANT CHANGE UP (ref 0–2)
BUN SERPL-MCNC: 19 MG/DL — SIGNIFICANT CHANGE UP (ref 7–23)
CALCIUM SERPL-MCNC: 7.7 MG/DL — LOW (ref 8.5–10.1)
CALCIUM SERPL-MCNC: 8.3 MG/DL — LOW (ref 8.5–10.1)
CHLORIDE SERPL-SCNC: 107 MMOL/L — SIGNIFICANT CHANGE UP (ref 96–108)
CO2 SERPL-SCNC: 24 MMOL/L — SIGNIFICANT CHANGE UP (ref 22–31)
CREAT SERPL-MCNC: 1.1 MG/DL — SIGNIFICANT CHANGE UP (ref 0.5–1.3)
CULTURE RESULTS: SIGNIFICANT CHANGE UP
EGFR: 71 ML/MIN/1.73M2 — SIGNIFICANT CHANGE UP
EOSINOPHIL # BLD AUTO: 0.04 K/UL — SIGNIFICANT CHANGE UP (ref 0–0.5)
EOSINOPHIL NFR BLD AUTO: 0.5 % — SIGNIFICANT CHANGE UP (ref 0–6)
GLUCOSE SERPL-MCNC: 95 MG/DL — SIGNIFICANT CHANGE UP (ref 70–99)
HCT VFR BLD CALC: 43 % — SIGNIFICANT CHANGE UP (ref 39–50)
HCV AB S/CO SERPL IA: 0.14 S/CO — SIGNIFICANT CHANGE UP (ref 0–0.99)
HCV AB SERPL-IMP: SIGNIFICANT CHANGE UP
HGB BLD-MCNC: 14.4 G/DL — SIGNIFICANT CHANGE UP (ref 13–17)
IMM GRANULOCYTES NFR BLD AUTO: 0.3 % — SIGNIFICANT CHANGE UP (ref 0–0.9)
LYMPHOCYTES # BLD AUTO: 0.86 K/UL — LOW (ref 1–3.3)
LYMPHOCYTES # BLD AUTO: 11.5 % — LOW (ref 13–44)
MCHC RBC-ENTMCNC: 30.8 PG — SIGNIFICANT CHANGE UP (ref 27–34)
MCHC RBC-ENTMCNC: 33.5 GM/DL — SIGNIFICANT CHANGE UP (ref 32–36)
MCV RBC AUTO: 91.9 FL — SIGNIFICANT CHANGE UP (ref 80–100)
MONOCYTES # BLD AUTO: 0.58 K/UL — SIGNIFICANT CHANGE UP (ref 0–0.9)
MONOCYTES NFR BLD AUTO: 7.8 % — SIGNIFICANT CHANGE UP (ref 2–14)
NEUTROPHILS # BLD AUTO: 5.94 K/UL — SIGNIFICANT CHANGE UP (ref 1.8–7.4)
NEUTROPHILS NFR BLD AUTO: 79.5 % — HIGH (ref 43–77)
NRBC # BLD: 0 /100 WBCS — SIGNIFICANT CHANGE UP (ref 0–0)
PLATELET # BLD AUTO: 210 K/UL — SIGNIFICANT CHANGE UP (ref 150–400)
POTASSIUM SERPL-MCNC: 3.7 MMOL/L — SIGNIFICANT CHANGE UP (ref 3.5–5.3)
POTASSIUM SERPL-SCNC: 3.7 MMOL/L — SIGNIFICANT CHANGE UP (ref 3.5–5.3)
RBC # BLD: 4.68 M/UL — SIGNIFICANT CHANGE UP (ref 4.2–5.8)
RBC # FLD: 14.3 % — SIGNIFICANT CHANGE UP (ref 10.3–14.5)
SODIUM SERPL-SCNC: 137 MMOL/L — SIGNIFICANT CHANGE UP (ref 135–145)
SPECIMEN SOURCE: SIGNIFICANT CHANGE UP
T3FREE SERPL-MCNC: 2.13 PG/ML — SIGNIFICANT CHANGE UP (ref 2–4.4)
T4 FREE SERPL-MCNC: 1 NG/DL — SIGNIFICANT CHANGE UP (ref 0.9–1.8)
TSH SERPL-MCNC: 0.43 UIU/ML — SIGNIFICANT CHANGE UP (ref 0.36–3.74)
WBC # BLD: 7.47 K/UL — SIGNIFICANT CHANGE UP (ref 3.8–10.5)
WBC # FLD AUTO: 7.47 K/UL — SIGNIFICANT CHANGE UP (ref 3.8–10.5)

## 2023-02-16 PROCEDURE — 92610 EVALUATE SWALLOWING FUNCTION: CPT

## 2023-02-16 PROCEDURE — 87086 URINE CULTURE/COLONY COUNT: CPT

## 2023-02-16 PROCEDURE — 87637 SARSCOV2&INF A&B&RSV AMP PRB: CPT

## 2023-02-16 PROCEDURE — 71250 CT THORAX DX C-: CPT | Mod: MA

## 2023-02-16 PROCEDURE — 97530 THERAPEUTIC ACTIVITIES: CPT

## 2023-02-16 PROCEDURE — 85730 THROMBOPLASTIN TIME PARTIAL: CPT

## 2023-02-16 PROCEDURE — 82607 VITAMIN B-12: CPT

## 2023-02-16 PROCEDURE — 70544 MR ANGIOGRAPHY HEAD W/O DYE: CPT

## 2023-02-16 PROCEDURE — 71045 X-RAY EXAM CHEST 1 VIEW: CPT

## 2023-02-16 PROCEDURE — 74176 CT ABD & PELVIS W/O CONTRAST: CPT

## 2023-02-16 PROCEDURE — 82310 ASSAY OF CALCIUM: CPT

## 2023-02-16 PROCEDURE — 86803 HEPATITIS C AB TEST: CPT

## 2023-02-16 PROCEDURE — 97116 GAIT TRAINING THERAPY: CPT

## 2023-02-16 PROCEDURE — 84443 ASSAY THYROID STIM HORMONE: CPT

## 2023-02-16 PROCEDURE — 36415 COLL VENOUS BLD VENIPUNCTURE: CPT

## 2023-02-16 PROCEDURE — A9579: CPT

## 2023-02-16 PROCEDURE — 84481 FREE ASSAY (FT-3): CPT

## 2023-02-16 PROCEDURE — 85025 COMPLETE CBC W/AUTO DIFF WBC: CPT

## 2023-02-16 PROCEDURE — 99285 EMERGENCY DEPT VISIT HI MDM: CPT

## 2023-02-16 PROCEDURE — 81001 URINALYSIS AUTO W/SCOPE: CPT

## 2023-02-16 PROCEDURE — 83036 HEMOGLOBIN GLYCOSYLATED A1C: CPT

## 2023-02-16 PROCEDURE — 82040 ASSAY OF SERUM ALBUMIN: CPT

## 2023-02-16 PROCEDURE — 93005 ELECTROCARDIOGRAM TRACING: CPT

## 2023-02-16 PROCEDURE — 99239 HOSP IP/OBS DSCHRG MGMT >30: CPT

## 2023-02-16 PROCEDURE — 85610 PROTHROMBIN TIME: CPT

## 2023-02-16 PROCEDURE — 80061 LIPID PANEL: CPT

## 2023-02-16 PROCEDURE — 82746 ASSAY OF FOLIC ACID SERUM: CPT

## 2023-02-16 PROCEDURE — 95816 EEG AWAKE AND DROWSY: CPT

## 2023-02-16 PROCEDURE — 70549 MR ANGIOGRAPH NECK W/O&W/DYE: CPT

## 2023-02-16 PROCEDURE — 70551 MRI BRAIN STEM W/O DYE: CPT

## 2023-02-16 PROCEDURE — 84446 ASSAY OF VITAMIN E: CPT

## 2023-02-16 PROCEDURE — 80053 COMPREHEN METABOLIC PANEL: CPT

## 2023-02-16 PROCEDURE — 97161 PT EVAL LOW COMPLEX 20 MIN: CPT

## 2023-02-16 PROCEDURE — 84439 ASSAY OF FREE THYROXINE: CPT

## 2023-02-16 PROCEDURE — 96374 THER/PROPH/DIAG INJ IV PUSH: CPT

## 2023-02-16 PROCEDURE — 83605 ASSAY OF LACTIC ACID: CPT

## 2023-02-16 PROCEDURE — 80048 BASIC METABOLIC PNL TOTAL CA: CPT

## 2023-02-16 PROCEDURE — 87040 BLOOD CULTURE FOR BACTERIA: CPT

## 2023-02-16 PROCEDURE — 93306 TTE W/DOPPLER COMPLETE: CPT

## 2023-02-16 PROCEDURE — 70450 CT HEAD/BRAIN W/O DYE: CPT

## 2023-02-16 RX ORDER — ACETAMINOPHEN 500 MG
650 TABLET ORAL EVERY 6 HOURS
Refills: 0 | Status: DISCONTINUED | OUTPATIENT
Start: 2023-02-16 | End: 2023-02-16

## 2023-02-16 RX ORDER — ASPIRIN/CALCIUM CARB/MAGNESIUM 324 MG
1 TABLET ORAL
Qty: 30 | Refills: 0
Start: 2023-02-16 | End: 2023-03-17

## 2023-02-16 RX ORDER — ACETAMINOPHEN 500 MG
2 TABLET ORAL
Qty: 0 | Refills: 0 | DISCHARGE
Start: 2023-02-16

## 2023-02-16 RX ADMIN — Medication 650 MILLIGRAM(S): at 04:45

## 2023-02-16 RX ADMIN — Medication 650 MILLIGRAM(S): at 05:45

## 2023-02-16 RX ADMIN — Medication 81 MILLIGRAM(S): at 11:01

## 2023-02-16 NOTE — DISCHARGE NOTE PROVIDER - CARE PROVIDER_API CALL
Jose Hall)  Infectious Disease; Internal Medicine  [address] [place] [address] [id number]  Phone: [phone]  Fax: [phone]  Follow Up Time: 1-3 days    your neurologist,   Phone: (   )    -  Fax: (   )    -  Follow Up Time: Routine

## 2023-02-16 NOTE — PROGRESS NOTE ADULT - PROBLEM SELECTOR PLAN 5
- Creatinine 1.3 on admission. Baseline creatinine 1.35 (08/2022)  - Monitor BMP  - Avoid nephrotoxic medications  - Monitor renal indices
- Creatinine 1.3 on admission. Baseline creatinine 1.35 (08/2022)  - Monitor BMP  - Avoid nephrotoxic medications  - Monitor renal indices

## 2023-02-16 NOTE — PROGRESS NOTE ADULT - PROBLEM SELECTOR PLAN 3
Patient saw his neurologist Dr. Cherry on 02/14/2023 for persistent imbalance and unsteadiness, possible parkinson's disease, was order DaTscan.   - Fall precautions   - PT/OT
Patient saw his neurologist Dr. Cherry on 02/14/2023 for persistent imbalance and unsteadiness, possible parkinson's disease, was order DaTscan.   - Fall precautions   - PT/OT

## 2023-02-16 NOTE — PROGRESS NOTE ADULT - SUBJECTIVE AND OBJECTIVE BOX
Neurology follow up note    LOIS [id number]      Interval History:    Patient feels ok no new complaints.    Allergies    No Known Drug Allergies  shellfish (Anaphylaxis)    Intolerances        MEDICATIONS    acetaminophen     Tablet .. 650 milliGRAM(s) Oral every 6 hours PRN  aspirin  chewable 81 milliGRAM(s) Oral daily  atorvastatin 80 milliGRAM(s) Oral at bedtime              Vital Signs Last 24 Hrs  T(C): 37.6 (2023 05:45), Max: 38 (2023 04:27)  T(F): 99.6 (2023 05:45), Max: 100.4 (2023 04:27)  HR: 84 (2023 04:27) (69 - 84)  BP: 120/70 (2023 04:27) (112/73 - 132/78)  BP(mean): --  RR: 19 (2023 04:27) (16 - 19)  SpO2: 92% (2023 04:27) (92% - 97%)    Parameters below as of 2023 04:27  Patient On (Oxygen Delivery Method): room air      REVIEW OF SYSTEMS:  Constitutional:  The patient denies fever, chills, or night sweats.  Head:  No headache.  Eyes:  No double vision or blurry vision.  Ears:  No ringing in the ears.  Neck:  No neck pain.  Respiratory:  No shortness of breath.  Cardiovascular:  No chest pain.  Abdomen:  No nausea, vomiting, or abdominal pain.  Extremities/Neurological:  No numbness or tingling.  Musculoskeletal:  No joint pain.    PHYSICAL EXAMINATION:    HEENT:  Head:  Normocephalic, atraumatic.  Eyes:  No scleral icterus.  Ears:  Hearing bilaterally intact.  NECK:  Supple.  RESPIRATORY:  Good air entry bilaterally.  CARDIOVASCULAR:  S1 and S2 heard.  ABDOMEN:  Soft and nontender.  EXTREMITIES:  No clubbing or cyanosis was noted.      NEUROLOGIC:  The patient is awake and alert.   Was able to name simple objects.  Extraocular movements were intact.  Speech was fluent.  Smile symmetric.  Motor:  Bilateral upper 4+/5, bilateral lower 4-/5.  The patient does have signs of bradykinesia.  No resting tremor was noted.  No action tremor was noted with me.  No cogwheel rigidity was noted.                LABS:  CBC Full  -  ( 15 Feb 2023 00:06 )  WBC Count : 10.92 K/uL  RBC Count : 5.18 M/uL  Hemoglobin : 15.9 g/dL  Hematocrit : 47.6 %  Platelet Count - Automated : 263 K/uL  Mean Cell Volume : 91.9 fl  Mean Cell Hemoglobin : 30.7 pg  Mean Cell Hemoglobin Concentration : 33.4 gm/dL  Auto Neutrophil # : 10.36 K/uL  Auto Lymphocyte # : 0.19 K/uL  Auto Monocyte # : 0.32 K/uL  Auto Eosinophil # : 0.00 K/uL  Auto Basophil # : 0.03 K/uL  Auto Neutrophil % : 94.9 %  Auto Lymphocyte % : 1.7 %  Auto Monocyte % : 2.9 %  Auto Eosinophil % : 0.0 %  Auto Basophil % : 0.3 %    Urinalysis Basic - ( 15 Feb 2023 01:50 )    Color: Yellow / Appearance: Clear / S.015 / pH: x  Gluc: x / Ketone: Negative  / Bili: Negative / Urobili: Negative   Blood: x / Protein: 15 / Nitrite: Negative   Leuk Esterase: Negative / RBC: 0-2 /HPF / WBC 0-2   Sq Epi: x / Non Sq Epi: Negative / Bacteria: Occasional      02-15    138  |  107  |  23  ----------------------------<  112<H>  4.2   |  24  |  1.20    Ca    8.3<L>      15 Feb 2023 05:48    TPro  7.5  /  Alb  3.6  /  TBili  0.5  /  DBili  x   /  AST  24  /  ALT  32  /  AlkPhos  63  02-15    Hemoglobin A1C:     LIVER FUNCTIONS - ( 15 Feb 2023 00:06 )  Alb: 3.6 g/dL / Pro: 7.5 g/dL / ALK PHOS: 63 U/L / ALT: 32 U/L / AST: 24 U/L / GGT: x           Vitamin B12 Vitamin B12, Serum: 513 pg/mL (02-15 @ 10:00)    PT/INR - ( 15 Feb 2023 00:06 )   PT: 12.3 sec;   INR: 1.05 ratio         PTT - ( 15 Feb 2023 00:06 )  PTT:27.2 sec      RADIOLOGY    ANALYSIS AND PLAN:  This is a 73-year-old with history of difficulty ambulating.  For episode of ataxia and generalized weakness, suspect this could be more metabolic in etiology, possibly low-grade temperature or any type of viral syndrome.  We will rule out other causes of generalized weakness.  We will do metabolic workup.  For possibly underlying dilated ventricle, the patient does see an outside neurosurgeon and neurologist.  He is scheduled to undergo a DaTscan.  He will have outpatient followup.  MRI  MRA imaging of the brain/neck normal   We will attempt to undergo EEG secondary to episodes of blank look, suspect less likely though this is a subclinical seizure event.  For hyperlipidemia, continue on statin.    Spoke with spouse, Marina, at [phone].    Greater than 45 minutes of time was spent with the patient, plan of care, reviewing data, with greater than 50% of the visit was spent counseling and/or coordinating care with multidisciplinary healthcare team   Neurology follow up note    LOIS [id number]      Interval History:    Patient feels ok no new complaints.    Allergies    No Known Drug Allergies  shellfish (Anaphylaxis)    Intolerances        MEDICATIONS    acetaminophen     Tablet .. 650 milliGRAM(s) Oral every 6 hours PRN  aspirin  chewable 81 milliGRAM(s) Oral daily  atorvastatin 80 milliGRAM(s) Oral at bedtime              Vital Signs Last 24 Hrs  T(C): 37.6 (2023 05:45), Max: 38 (2023 04:27)  T(F): 99.6 (2023 05:45), Max: 100.4 (2023 04:27)  HR: 84 (2023 04:27) (69 - 84)  BP: 120/70 (2023 04:27) (112/73 - 132/78)  BP(mean): --  RR: 19 (2023 04:27) (16 - 19)  SpO2: 92% (2023 04:27) (92% - 97%)    Parameters below as of 2023 04:27  Patient On (Oxygen Delivery Method): room air      REVIEW OF SYSTEMS:  Constitutional:  The patient denies fever, chills, or night sweats.  Head:  No headache.  Eyes:  No double vision or blurry vision.  Ears:  No ringing in the ears.  Neck:  No neck pain.  Respiratory:  No shortness of breath.  Cardiovascular:  No chest pain.  Abdomen:  No nausea, vomiting, or abdominal pain.  Extremities/Neurological:  No numbness or tingling.  Musculoskeletal:  No joint pain.    PHYSICAL EXAMINATION:    HEENT:  Head:  Normocephalic, atraumatic.  Eyes:  No scleral icterus.  Ears:  Hearing bilaterally intact.  NECK:  Supple.  RESPIRATORY:  Good air entry bilaterally.  CARDIOVASCULAR:  S1 and S2 heard.  ABDOMEN:  Soft and nontender.  EXTREMITIES:  No clubbing or cyanosis was noted.     NEUROLOGIC:  The patient is awake and alert.   Was able to name simple objects.  Extraocular movements were intact.  Speech was fluent.  Smile symmetric.  Motor:  Bilateral upper 4+/5, bilateral lower 4-/5.  The patient does have signs of bradykinesia.  No resting tremor was noted.  No action tremor was noted with me.  No cogwheel rigidity was noted.      LABS:  CBC Full  -  ( 15 Feb 2023 00:06 )  WBC Count : 10.92 K/uL  RBC Count : 5.18 M/uL  Hemoglobin : 15.9 g/dL  Hematocrit : 47.6 %  Platelet Count - Automated : 263 K/uL  Mean Cell Volume : 91.9 fl  Mean Cell Hemoglobin : 30.7 pg  Mean Cell Hemoglobin Concentration : 33.4 gm/dL  Auto Neutrophil # : 10.36 K/uL  Auto Lymphocyte # : 0.19 K/uL  Auto Monocyte # : 0.32 K/uL  Auto Eosinophil # : 0.00 K/uL  Auto Basophil # : 0.03 K/uL  Auto Neutrophil % : 94.9 %  Auto Lymphocyte % : 1.7 %  Auto Monocyte % : 2.9 %  Auto Eosinophil % : 0.0 %  Auto Basophil % : 0.3 %    Urinalysis Basic - ( 15 Feb 2023 01:50 )    Color: Yellow / Appearance: Clear / S.015 / pH: x  Gluc: x / Ketone: Negative  / Bili: Negative / Urobili: Negative   Blood: x / Protein: 15 / Nitrite: Negative   Leuk Esterase: Negative / RBC: 0-2 /HPF / WBC 0-2   Sq Epi: x / Non Sq Epi: Negative / Bacteria: Occasional      02-15    138  |  107  |  23  ----------------------------<  112<H>  4.2   |  24  |  1.20    Ca    8.3<L>      15 Feb 2023 05:48    TPro  7.5  /  Alb  3.6  /  TBili  0.5  /  DBili  x   /  AST  24  /  ALT  32  /  AlkPhos  63  02-15    Hemoglobin A1C:     LIVER FUNCTIONS - ( 15 Feb 2023 00:06 )  Alb: 3.6 g/dL / Pro: 7.5 g/dL / ALK PHOS: 63 U/L / ALT: 32 U/L / AST: 24 U/L / GGT: x           Vitamin B12 Vitamin B12, Serum: 513 pg/mL (02-15 @ 10:00)    PT/INR - ( 15 Feb 2023 00:06 )   PT: 12.3 sec;   INR: 1.05 ratio         PTT - ( 15 Feb 2023 00:06 )  PTT:27.2 sec      RADIOLOGY    ANALYSIS AND PLAN:  This is a 73-year-old with history of difficulty ambulating.  For episode of ataxia and generalized weakness, suspect this could be more metabolic in etiology, possibly low-grade temperature or any type of viral syndrome.  We will rule out other causes of generalized weakness.  We will do metabolic workup.  For possibly underlying dilated ventricle, the patient does see an outside neurosurgeon and neurologist.  He is scheduled to undergo a DaTscan.  He will have outpatient followup.  MRI  MRA imaging of the brain/neck normal   We will attempt to undergo EEG secondary to episodes of blank look, suspect less likely though this is a subclinical seizure event.  For hyperlipidemia, continue on statin.    Spoke with spouse, Marina, at [phone] called son multiple times   who is ER physician lives on [facility]     Greater than 45 minutes of time was spent with the patient, plan of care, reviewing data, with greater than 50% of the visit was spent counseling and/or coordinating care with multidisciplinary healthcare team

## 2023-02-16 NOTE — DISCHARGE NOTE PROVIDER - NSDCMRMEDTOKEN_GEN_ALL_CORE_FT
acetaminophen 325 mg oral tablet: 2 tab(s) orally every 6 hours, As needed, Temp greater or equal to 38C (100.4F), Mild Pain (1 - 3)  Aspirin Low Dose 81 mg oral delayed release tablet: 1 tab(s) orally once a day   atorvastatin 10 mg oral tablet: 1 tab(s) orally once a day

## 2023-02-16 NOTE — DISCHARGE NOTE PROVIDER - NSDCCPCAREPLAN_GEN_ALL_CORE_FT
PRINCIPAL DISCHARGE DIAGNOSIS  Diagnosis: Altered mental state  Assessment and Plan of Treatment: improved, neuro work up no significant finding, out patient neurology follow up      SECONDARY DISCHARGE DIAGNOSES  Diagnosis: History of unsteady gait  Assessment and Plan of Treatment: out patient neurology follow up    Diagnosis: Fever  Assessment and Plan of Treatment: your were evaluated by Infectious disease specialist, no need for antibiotic treatment, monitor Temperature and symptom at home, seek medical care if any worsening of symptom, otherwise close follow up with primary care doctor as instructed

## 2023-02-16 NOTE — DISCHARGE NOTE NURSING/CASE MANAGEMENT/SOCIAL WORK - PATIENT PORTAL LINK FT
You can access the FollowMyHealth Patient Portal offered by [facility] by registering at the following website: [email or web address]. By joining [facility]’s FollowMyHealth portal, you will also be able to view your health information using other applications (apps) compatible with our system.

## 2023-02-16 NOTE — CARE COORDINATION ASSESSMENT. - NSCAREPROVIDERS_GEN_ALL_CORE_FT
CARE PROVIDERS:  Accepting Physician: Cherise Duron  Administration: Adonis Villanueva  Admitting: Cherise Duron  Attending: Cherise Duron  Cardiology Technician: Saint Hubert, Marie  Case Management: Mathieu Camp  Case Management: Ceci Baig  Consultant: Otis Green  Consultant: Patrick Del Toro  Consultant: Alisa Shafer  Covering Team: Moira Mckenzie  Covering Team: Marline Breaux  ED Attending: Zehra Quinn  ED Nurse: Samia Melgar  Nurse: Francine Jc  Ordered: Physician, Ordering  Ordered: ADM, User  Override: Jacque Zarco  PCA/Nursing Assistant: Carolyn Rocha  Physical Therapy: Andres Cox  Physical Therapy: Humberto Chaidez  Primary Team: Michael Jon  Primary Team: Chacha Medrano  Primary Team: Cherise Duron  Registered Dietitian: Beckie Hubbard  Research: Kathryn Nava  : Linda Mccall

## 2023-02-16 NOTE — PROGRESS NOTE ADULT - PROBLEM SELECTOR PLAN 2
-  MRI of the brain in 2019 showed evidence of atrophy, periventricular white matter changes and evidence of hydrocephalus. Previously evaluated by neurosurgery for potential diagnosis of normal pressure hydrocephalus, but found not a candidate.   - Fall precautions  - neurology /neuro Sx out patient follow up
-  MRI of the brain in 2019 showed evidence of atrophy, periventricular white matter changes and evidence of hydrocephalus. Previously evaluated by neurosurgery for potential diagnosis of normal pressure hydrocephalus, but found not a candidate.   - Fall precautions  - neurology /neuro Sx follow up

## 2023-02-16 NOTE — CONSULT NOTE ADULT - ASSESSMENT
73 years old male patient with PMHx of Left knee replacement, Intentional tremor, ?normal pressure hydrocephalus syndrome?, restless leg, imbalance brought in for AMS. At this point reports he is back to baseline.    RECOMMENDATIONS  No obv etiology for AMS but low suspicion that this is being driven by an acute infectious process that would benefit from abx. NEck is supple, rapid return to baseline. No issue from ID with dc off abx and follow up w Dr Hall.    Thank you for consulting us and involving us in the management of this most interesting and challenging case.  We will follow along in the care of this patient. Please call us at [phone] or text me directly on my cell# at [phone] with any concerns.

## 2023-02-16 NOTE — PROGRESS NOTE ADULT - ASSESSMENT
73 years old male patient with PMHx of Left knee replacement, Intentional tremor, normal pressure hydrocephalus syndrome, restless leg, imbalance brought in today for AMS. Per chart review patient saw his neurologist Dr. Cherry on 02/15/2023 for persistent imbalance and unsteadiness, possible parkinson's disease. Patient admitted for TIA work up.     
73 years old male patient with PMHx of Left knee replacement, Intentional tremor, normal pressure hydrocephalus syndrome, restless leg, imbalance brought in today for AMS. Per chart review patient saw his neurologist Dr. Cherry on 02/15/2023 for persistent imbalance and unsteadiness, possible parkinson's disease. Patient admitted for TIA work up.

## 2023-02-16 NOTE — PROGRESS NOTE ADULT - SUBJECTIVE AND OBJECTIVE BOX
Patient is a 73y old  Male who presents with a chief complaint of TIA workup (2023 07:23)      Subjective:  INTERVAL HPI/OVERNIGHT EVENTS: Patient seen and examined at bedside.  Patient has no complaints at this time.   MEDICATIONS  (STANDING):  aspirin  chewable 81 milliGRAM(s) Oral daily  atorvastatin 80 milliGRAM(s) Oral at bedtime    MEDICATIONS  (PRN):  acetaminophen     Tablet .. 650 milliGRAM(s) Oral every 6 hours PRN Temp greater or equal to 38C (100.4F), Mild Pain (1 - 3)      Allergies    No Known Drug Allergies  shellfish (Anaphylaxis)    Intolerances        REVIEW OF SYSTEMS:  CONSTITUTIONAL: No fever or chills  HEENT:  No headache, no sore throat  RESPIRATORY: No cough or shortness of breath  CARDIOVASCULAR: No chest pain or palpitations  GASTROINTESTINAL: No abd pain, nausea, vomiting, or diarrhea      Objective:  Vital Signs Last 24 Hrs  T(C): 37.6 (2023 05:45), Max: 38 (2023 04:27)  T(F): 99.6 (2023 05:45), Max: 100.4 (2023 04:27)  HR: 84 (2023 04:27) (69 - 84)  BP: 120/70 (2023 04:27) (115/71 - 120/70)  BP(mean): --  RR: 19 (2023 04:27) (19 - 19)  SpO2: 92% (2023 04:27) (92% - 97%)    Parameters below as of 2023 04:27  Patient On (Oxygen Delivery Method): room air        GENERAL: NAD, lying in bed comfortably  HEAD:  Normocephalic  EYES:  conjunctiva and sclera clear  ENT: Moist mucous membranes  NECK: Supple  CHEST/LUNG: Clear to auscultation bilaterally; No rales or rhonchi; no wheezing. Unlabored respirations  HEART: Regular rate and rhythm; S1S2+  ABDOMEN: Bowel sounds present; Soft, Nontender, Nondistended.   EXTREMITIES:  + distal Peripheral Pulses;  No cyanosis, or edema  NERVOUS SYSTEM:  Alert & Oriented X3;  No gross focal deficits   MSK: moves all extremities  SKIN: No rashes     LABS:                        14.4   7.47  )-----------( 210      ( 2023 07:19 )             43.0     2023 07:19    137    |  107    |  19     ----------------------------<  95     3.7     |  24     |  1.10     Ca    8.3        2023 13:00    TPro  x      /  Alb  3.3    /  TBili  x      /  DBili  x      /  AST  x      /  ALT  x      /  AlkPhos  x      2023 13:00    PT/INR - ( 15 Feb 2023 00:06 )   PT: 12.3 sec;   INR: 1.05 ratio         PTT - ( 15 Feb 2023 00:06 )  PTT:27.2 sec  Urinalysis Basic - ( 15 Feb 2023 01:50 )    Color: Yellow / Appearance: Clear / S.015 / pH: x  Gluc: x / Ketone: Negative  / Bili: Negative / Urobili: Negative   Blood: x / Protein: 15 / Nitrite: Negative   Leuk Esterase: Negative / RBC: 0-2 /HPF / WBC 0-2   Sq Epi: x / Non Sq Epi: Negative / Bacteria: Occasional      CAPILLARY BLOOD GLUCOSE            Culture - Urine (collected 02-15-23 @ 01:51)  Source: Clean Catch Clean Catch (Midstream)  Final Report (23 @ 08:31):    <10,000 CFU/mL Normal Urogenital [place]    Culture - Blood (collected 02-15-23 @ 00:06)  Source: .Blood Blood-Peripheral  Preliminary Report (23 @ 04:01):    No growth to date.    Culture - Blood (collected 02-15-23 @ 00:00)  Source: .Blood Blood-Peripheral  Preliminary Report (23 @ 04:01):    No growth to date.        RADIOLOGY & ADDITIONAL TESTS:    Personally reviewed.     Consultant(s) Notes Reviewed:  [x] YES  [ ] NO    Plan of care discussed with patient /family; all questions answered

## 2023-02-16 NOTE — CARE COORDINATION ASSESSMENT. - NSPASTMEDSURGHISTORY_GEN_ALL_CORE_FT
PAST MEDICAL & SURGICAL HISTORY:  Tremor      H/O hydrocephalus      Unstable gait      Restless leg      HLD (hyperlipidemia)      S/P wrist surgery      H/O left knee surgery

## 2023-02-16 NOTE — DISCHARGE NOTE PROVIDER - PROVIDER TOKENS
PROVIDER:[TOKEN:[[id number]],FOLLOWUP:[1-3 days]],FREE:[LAST:[your neurologist],PHONE:[(   )    -],FAX:[(   )    -],FOLLOWUP:[Routine]]

## 2023-02-16 NOTE — PROGRESS NOTE ADULT - PROBLEM SELECTOR PLAN 1
Patient presents  AMS, need to rule out  TIA vs. r/o CVA  - Admit to telemetry   - CT head shows Motion limited study. No evidence of acute intracranial hemorrhage,   midline shift or CT evidence of acute territorial infarct. The ventricular system is mildly dilated out of proportion to the surrounding sulci.   repeat CT didn't show acute intracranial pathology   - Aspirin 81 mg daily, Lipitor 80 mg qhs  - Neuro checks q4h  - Permissive HTN for 24 hours   - Telemetry monitoring to r/o arrhythmia   - Fall and aspiration precautions: regular diet as per S/S eval  - F/u TTE , done, report pending   - F/u MRI head  - Neurology (Dr. Green) consulted, f/u recs  - PT/OT -> no skilled PT needs
Patient presents  AMS, need to rule out  TIA vs. r/o CVA  - Admit to telemetry   - CT head shows Motion limited study. No evidence of acute intracranial hemorrhage,   midline shift or CT evidence of acute territorial infarct. The ventricular system is mildly dilated out of proportion to the surrounding sulci.   repeat CT didn't show acute intracranial pathology   - Aspirin 81 mg daily, Lipitor 80 mg qhs  - Telemetry monitoring to r/o arrhythmia   - Fall and aspiration precautions: regular diet as per S/S eval  - F/u TTE : estimated LVEF of 60%.  Grossly normal RV size and systolic function.  Normal trileaflet aortic valve, without AI.  Trace physiologic MR and TR.  No significant pericardial effusion.  - F/u MRI head: no acute intracranial pathology   - Neurology consult appreciated: EEG today   - PT/OT -> no skilled PT needs

## 2023-02-16 NOTE — DISCHARGE NOTE PROVIDER - HOSPITAL COURSE
73 years old male patient with PMHx of Left knee replacement, Intentional tremor, normal pressure hydrocephalus syndrome, restless leg, imbalance brought in today for AMS. Per chart review patient saw his neurologist Dr. Cherry on 02/15/2023 for persistent imbalance and unsteadiness, possible parkinson's disease. Patient admitted for TIA work up.          Problem/Plan - 1:  ·  Altered mental status: , likley metabolic etiology    - CT head shows Motion limited study. No evidence of acute intracranial hemorrhage,   midline shift or CT evidence of acute territorial infarct. The ventricular system is mildly dilated out of proportion to the surrounding sulci.   repeat CT didn't show acute intracranial pathology   MRI:< from: MR Angio Neck w/wo IV Cont (02.15.23 @ 14:08) >  Brain MRI: No acute infarct.  Brain MRA: No large vessel occlusion or aneurysm.  Neck MRA: No hemodynamically significant stenosis of the bilateral   cervical ICAs by NASCET criteria.  - TTE : estimated LVEF of 60%., Grossly normal RV size and systolic function., Normal trileaflet aortic valve, without AI. Trace physiologic MR and TR. No significant pericardial effusion.  - Neurology consult appreciated: EEG : Negative   - PT/OT -> no skilled PT needs.       Problem/Plan - 2:  ·  Problem: History of hydrocephalus.   ·  Plan: -  MRI of the brain in 2019 showed evidence of atrophy, periventricular white matter changes and evidence of hydrocephalus. Previously evaluated by neurosurgery for potential diagnosis of normal pressure hydrocephalus, but found not a candidate.   - Fall precautions  - neurology /neuro Sx out patient follow up.     Problem/Plan - 3:  ·  Problem: History of unsteady gait.   ·  Plan: Patient saw his neurologist Dr. Cherry on 02/14/2023 for persistent imbalance and unsteadiness, possible parkinson's disease, was order DaTscan.   - Fall precautions      Problem/Plan - 4:  ·  Problem: Leukocytosis: resolved   - F/up urine and blood cultures : NTD   CT abd: No acute CT findings.  ID evaluation appreciated     Problem/Plan - 5:  ·  Problem: CKD (chronic kidney disease). renal function stable

## 2023-02-16 NOTE — PROGRESS NOTE ADULT - PROBLEM SELECTOR PLAN 4
Unclear etiology, no evidence of acute infectious process   - Leukocytosis of 10.9 to normal   T-max 100.4 F today   - F/up urine and blood cultures : NTD   - Will monitor off antibiotics for now given low suspicion of bacterial infection   - Trend CBC with diff and temp  CT abd: No acute CT findings.

## 2023-02-16 NOTE — CONSULT NOTE ADULT - SUBJECTIVE AND OBJECTIVE BOX
[facility] DIVISION of INFECTIOUS DISEASE  Patrick Del Toro MD PhD, Natasha Ojeda MD, Natasha Doty MD, Dayanara Workman MD, Myke Galan MD  and providing coverage with Pat Mazariegos MD  Providing Infectious Disease Consultations at [facility], [facility], [place], [facility], [facility]'s    Office# [phone] to schedule follow up appointments  Answering Service for urgent calls or New Consults [phone]  Cell# to text for urgent issues Patrick Alverto [phone]     HPI:  73 years old male patient with PMHx of Left knee replacement, Intentional tremor, ?normal pressure hydrocephalus syndrome?, restless leg, imbalance brought in for AMS. Patient was acting "off" following a doctor's appointment. Patient was sitting in the den with a blank stare, and had trouble getting out of his chair. He was very unsteady, and she aided him to the bathroom. He vomited, non-bilious non-bloody. He urinated, and then could not get up from the toilet. Eventually he got up with her help, and she brought him back to the den. At this point, he urinated on himself. Wife notes he did not have any garbled or slurred speech and that he could speak in full sentences, however he was very off and seemed to be "in a haze." It was at this point she brought him to the ED for evaluation. Patient states the events of the day are somewhat difficult to recall, but he recalls being unsteady on his feet.  Of note, per chart review patient saw his neurologist Dr. Cherry on 2023 for persistent imbalance and unsteadiness, possible parkinson's disease, was ordered DaTscan. He has also been worked up for possible normal pressure hydrocephalus however diagnosis has not yet been made.    ED course:   VS: T-max 100.2 F HR: 91 BP : 119/80 SpO2: 96% RA RR: 18  Labs significant for Leukocytosis of 10.9 K with neutrophile 94.9%, BUN/creatinine: 26/1.30  EKG shows NS @ 78, QT/QTc: 366/417 ms   CT head shows Motion limited study. No evidence of acute intracranial hemorrhage, midline shift or CT evidence of acute territorial infarct. The ventricular system is mildly dilated out of proportion to the surrounding sulci.   CT chest No airspace consolidation.  Given Tylenol 650 mg PO x1, NS 1 Lt x1 in the ED    (15 Feb 2023 04:41)      PAST MEDICAL & SURGICAL HISTORY:  HLD (hyperlipidemia)      Restless leg      Unstable gait      H/O hydrocephalus      Tremor      H/O left knee surgery      S/P wrist surgery          Antimicrobials      Immunological      Other  acetaminophen     Tablet .. 650 milliGRAM(s) Oral every 6 hours PRN  aspirin  chewable 81 milliGRAM(s) Oral daily  atorvastatin 80 milliGRAM(s) Oral at bedtime      Allergies    No Known Drug Allergies  shellfish (Anaphylaxis)    Intolerances        SOCIAL HISTORY:  Tobacco: denies   EtOH: social   Recreational drug use: denies   Lives with: wife   Ambulates: independent   ADLs: independent (15 Feb 2023 04:41)      FAMILY HISTORY:  FH: dementia (Father, Mother)        ROS:    EYES:  Negative  blurry vision or double vision  GASTROINTESTINAL:  Negative for nausea, vomiting, diarrhea  -otherwise negative except for subjective    Vital Signs Last 24 Hrs  T(C): 36.8 (2023 14:04), Max: 38 (2023 04:27)  T(F): 98.2 (2023 14:04), Max: 100.4 (2023 04:27)  HR: 65 (2023 14:04) (65 - 84)  BP: 110/74 (2023 14:04) (110/74 - 120/70)  BP(mean): --  RR: 18 (2023 14:04) (18 - 19)  SpO2: 93% (2023 14:04) (92% - 97%)    Parameters below as of 2023 04:27  Patient On (Oxygen Delivery Method): room air        PE:  In no distress  HEENT:  NC, PERRL, sclerae anicteric, conjunctivae clear, EOMI.  Sinuses nontender, no nasal exudate.  No buccal or pharyngeal lesions, erythema or exudate  Neck:  Supple, no adenopathy  Lungs:  No accessory muscle use, breathing comfortably  Cor:  distant  Abd:  Symmetric, normoactive BS.  Soft, nontender, no masses, guarding or rebound.  Liver and spleen not enlarged  Extrem:  No cyanosis or edema  Skin:  No rashes.  Neuro: grossly intact  Musc: moving all limbs freely, no focal deficits        LABS:                        14.4   7.47  )-----------( 210      ( 2023 07:19 )             43.0       WBC Count: 7.47 K/uL (23 @ 07:19)  WBC Count: 10.92 K/uL (02-15-23 @ 00:06)          137  |  107  |  19  ----------------------------<  95  3.7   |  24  |  1.10    Ca    8.3<L>      2023 13:00    TPro  x   /  Alb  3.3  /  TBili  x   /  DBili  x   /  AST  x   /  ALT  x   /  AlkPhos  x         Creatinine, Serum: 1.10 mg/dL (23 @ 07:19)  Creatinine, Serum: 1.20 mg/dL (02-15-23 @ 05:48)  Creatinine, Serum: 1.30 mg/dL (02-15-23 @ 00:06)      Urinalysis Basic - ( 15 Feb 2023 01:50 )    Color: Yellow / Appearance: Clear / S.015 / pH: x  Gluc: x / Ketone: Negative  / Bili: Negative / Urobili: Negative   Blood: x / Protein: 15 / Nitrite: Negative   Leuk Esterase: Negative / RBC: 0-2 /HPF / WBC 0-2   Sq Epi: x / Non Sq Epi: Negative / Bacteria: Occasional              MICROBIOLOGY:      RADIOLOGY & ADDITIONAL STUDIES:    --

## 2023-02-16 NOTE — DISCHARGE NOTE NURSING/CASE MANAGEMENT/SOCIAL WORK - NSDCPEFALRISK_GEN_ALL_CORE
For information on Fall & Injury Prevention, visit: [email or web address] OR  [email or web address] OR  https://www.cdc.gov/steadi/patient.html

## 2023-02-16 NOTE — CARE COORDINATION ASSESSMENT. - CURRENT MENTAL STATUS/COGNITIVE FUNCTIONING
Met patient at bedside.  Explained role of CM, verbalized understanding. Pt was made aware a CM will remain available through hospitalization.  Contact information given in discharge/ transitions resource folder./alert/oriented to person/oriented to place/oriented to time/oriented to situation/recall memory is intact

## 2023-02-16 NOTE — CARE COORDINATION ASSESSMENT. - LIVING ARRANGEMENTS, PROFILE
5-6 steps into home  one flight up to bedroom  pt states he has no issues navigating the steps/house

## 2023-02-17 VITALS
SYSTOLIC BLOOD PRESSURE: 98 MMHG | TEMPERATURE: 98 F | OXYGEN SATURATION: 97 % | RESPIRATION RATE: 18 BRPM | HEART RATE: 77 BPM | DIASTOLIC BLOOD PRESSURE: 64 MMHG

## 2023-02-20 LAB
CULTURE RESULTS: SIGNIFICANT CHANGE UP
CULTURE RESULTS: SIGNIFICANT CHANGE UP
SPECIMEN SOURCE: SIGNIFICANT CHANGE UP
SPECIMEN SOURCE: SIGNIFICANT CHANGE UP

## 2023-02-28 LAB
A-TOCOPHEROL VIT E SERPL-MCNC: 8.1 MG/L — LOW (ref 9–29)
BETA+GAMMA TOCOPHEROL SERPL-MCNC: 0.3 MG/L — LOW (ref 0.5–4.9)

## 2023-03-01 ENCOUNTER — OUTPATIENT (OUTPATIENT)
Dept: OUTPATIENT SERVICES | Facility: HOSPITAL | Age: 73
LOS: 1 days | End: 2023-03-01
Payer: MEDICARE

## 2023-03-01 ENCOUNTER — APPOINTMENT (OUTPATIENT)
Dept: NUCLEAR MEDICINE | Facility: CLINIC | Age: 73
End: 2023-03-01

## 2023-03-01 DIAGNOSIS — Z98.890 OTHER SPECIFIED POSTPROCEDURAL STATES: Chronic | ICD-10-CM

## 2023-03-01 DIAGNOSIS — Z00.8 ENCOUNTER FOR OTHER GENERAL EXAMINATION: ICD-10-CM

## 2023-03-01 PROCEDURE — 78803 RP LOCLZJ TUM SPECT 1 AREA: CPT | Mod: 26

## 2023-03-02 ENCOUNTER — NON-APPOINTMENT (OUTPATIENT)
Age: 73
End: 2023-03-02

## 2023-03-08 ENCOUNTER — NON-APPOINTMENT (OUTPATIENT)
Age: 73
End: 2023-03-08

## 2023-03-08 PROBLEM — Z86.69 PERSONAL HISTORY OF OTHER DISEASES OF THE NERVOUS SYSTEM AND SENSE ORGANS: Chronic | Status: ACTIVE | Noted: 2023-02-15

## 2023-03-08 PROBLEM — E78.5 HYPERLIPIDEMIA, UNSPECIFIED: Chronic | Status: ACTIVE | Noted: 2023-02-15

## 2023-03-08 PROBLEM — R26.81 UNSTEADINESS ON FEET: Chronic | Status: ACTIVE | Noted: 2023-02-15

## 2023-03-08 PROBLEM — R25.1 TREMOR, UNSPECIFIED: Chronic | Status: ACTIVE | Noted: 2023-02-15

## 2023-03-08 PROBLEM — G25.81 RESTLESS LEGS SYNDROME: Chronic | Status: ACTIVE | Noted: 2023-02-15

## 2023-03-13 ENCOUNTER — APPOINTMENT (OUTPATIENT)
Dept: NEUROLOGY | Facility: CLINIC | Age: 73
End: 2023-03-13
Payer: MEDICARE

## 2023-03-13 VITALS
HEIGHT: 71 IN | WEIGHT: 239 LBS | SYSTOLIC BLOOD PRESSURE: 111 MMHG | DIASTOLIC BLOOD PRESSURE: 77 MMHG | HEART RATE: 69 BPM | BODY MASS INDEX: 33.46 KG/M2 | TEMPERATURE: 97.6 F

## 2023-03-13 DIAGNOSIS — G25.2 OTHER SPECIFIED FORMS OF TREMOR: ICD-10-CM

## 2023-03-13 DIAGNOSIS — J32.4 CHRONIC PANSINUSITIS: ICD-10-CM

## 2023-03-13 PROCEDURE — 99214 OFFICE O/P EST MOD 30 MIN: CPT

## 2023-03-13 NOTE — REVIEW OF SYSTEMS
[Fever] : fever [Memory Lapses or Loss] : memory loss [Lightheadedness] : lightheadedness [Difficulty Walking] : difficulty walking [Tingling] : tingling [Feeling Tired] : not feeling tired [Sleep Disturbances] : no sleep disturbances [Anxiety] : no anxiety [Arm Weakness] : no arm weakness [Leg Weakness] : no leg weakness [Numbness] : no numbness [Seizures] : no convulsions [Dizziness] : no dizziness [Migraine Headache] : no migraine headache [Eye Pain] : no eye pain [Loss Of Hearing] : no hearing loss [Heart Rate Is Fast] : the heart rate was not fast [Chest Pain] : no chest pain [Palpitations] : no palpitations [Shortness Of Breath] : no shortness of breath [Wheezing] : no wheezing [Abdominal Pain] : no abdominal pain [Vomiting] : no vomiting [Dysuria] : no dysuria [Skin Lesions] : no skin lesions [Skin Wound] : no skin wound [Swollen Glands] : no swollen glands [Swollen Glands In The Neck] : no swollen glands in the neck

## 2023-03-13 NOTE — DISCUSSION/SUMMARY
[FreeTextEntry1] : 73-year-old male returns to clinic for follow-up evaluation for unsteady gait with multiple ground-level falls without significant injury and mild cognitive difficulties, MoCA 29/30.\par \par #Memory difficulty\par #Pansinusitis unspecified chronicity\par #Balance problems with intentional tremors\par \par 1.  Renewed physical therapy referral for gait and balance training with core motor muscle strengthening\par 2.  Follow-up with neurosurgery as scheduled on 3/14/2023\par 3.  Requested neuro tracts testing to further evaluate cognitive function\par 4.  Referral to ENT for pansinusitis with ethmoid and maxillary sinuses\par 5.  Return to clinic after completion of neuro tracks testing
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
## 2023-03-13 NOTE — HISTORY OF PRESENT ILLNESS
[FreeTextEntry1] : 73-year-old male returns to clinic for follow-up evaluation for unsteady gait and repeated falls.  Patient status post a left total knee arthroplasty approximately 6-7 months ago.  Patient also stating mild cognitive questioning as follows no loss of recognition of immediate family members, no hallucinations verbal or visual, no mood changes with irritability aggressiveness.  Negative issues with appliances such as leaving the stove on, sinks running refrigerator door open or front door with locks and it unlocked and opened.  Patient does state history of misplacing items such as cell phone TV remotes and car keys, patient denies needing help with toileting laying out close assistance with hygiene or sleeping in the same day's clothing.  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  Concern for Parkinson's DaTscan was negative on 2/14/2023 patient also prior seen in [facility] with serial CT of brain's that were negative except for noting some hydrocephalus CT chest abdomen pelvis negative MRI brain negative for infarction but positive pansinusitis with bilateral ethmoids and maxillary sinuses, MRA brain no aneurysms LVO, MRA of neck no occlusions or stenosis.  MoCA 29/30.  Patient has appointment with neurosurgery 3/14/2023 for evaluation of NPH.

## 2023-03-14 ENCOUNTER — TRANSCRIPTION ENCOUNTER (OUTPATIENT)
Age: 73
End: 2023-03-14

## 2023-03-14 ENCOUNTER — APPOINTMENT (OUTPATIENT)
Dept: NEUROSURGERY | Facility: CLINIC | Age: 73
End: 2023-03-14
Payer: MEDICARE

## 2023-03-14 ENCOUNTER — NON-APPOINTMENT (OUTPATIENT)
Age: 73
End: 2023-03-14

## 2023-03-14 VITALS
HEIGHT: 71 IN | HEART RATE: 63 BPM | SYSTOLIC BLOOD PRESSURE: 122 MMHG | BODY MASS INDEX: 33.46 KG/M2 | WEIGHT: 239 LBS | DIASTOLIC BLOOD PRESSURE: 79 MMHG | OXYGEN SATURATION: 96 %

## 2023-03-14 PROCEDURE — 99214 OFFICE O/P EST MOD 30 MIN: CPT

## 2023-03-14 NOTE — REASON FOR VISIT
[New Patient Visit] : a new patient visit [Referred By: _________] : Patient was referred by TAY [Spouse] : spouse

## 2023-03-16 NOTE — PHYSICAL EXAM
[General Appearance - Alert] : alert [General Appearance - In No Acute Distress] : in no acute distress [General Appearance - Well Nourished] : well nourished [General Appearance - Well-Appearing] : healthy appearing [Oriented To Time, Place, And Person] : oriented to person, place, and time [Affect] : the affect was normal [Mood] : the mood was normal [Person] : oriented to person [Place] : oriented to place [Time] : oriented to time [Cranial Nerves Oculomotor (III)] : extraocular motion intact [Cranial Nerves Trigeminal (V)] : facial sensation intact symmetrically [Cranial Nerves Facial (VII)] : face symmetrical [Cranial Nerves Vestibulocochlear (VIII)] : hearing was intact bilaterally [Cranial Nerves Glossopharyngeal (IX)] : tongue and palate midline [Cranial Nerves Accessory (XI - Cranial And Spinal)] : head turning and shoulder shrug symmetric [Cranial Nerves Hypoglossal (XII)] : there was no tongue deviation with protrusion [Motor Tone] : muscle tone was normal in all four extremities [Motor Strength] : muscle strength was normal in all four extremities [Motor Handedness Right-Handed] : the patient is right hand dominant [Sensation Tactile Decrease] : light touch was intact [] : no respiratory distress [Respiration, Rhythm And Depth] : normal respiratory rhythm and effort [Romberg's Sign] : Romberg's sign was negtive [FreeTextEntry6] : no resting tremor, no postural tremor with arms extended or winged, no tremor when holding mug or bringing to mouth [FreeTextEntry8] : able to stand with feet together, able to tandem stance, unable to tandem gait without taking steps out [FreeTextEntry1] : stooped posture, slight wide based gait, normal arm swing,  mild stiffness on RUE

## 2023-03-16 NOTE — HISTORY OF PRESENT ILLNESS
[> 3 months] : more  than 3 months [FreeTextEntry1] : balance difficulty [de-identified] : LOIS CARTER is a 73 year old right handed male with PMH of HLD. Not on antiplatelets/anticoagulation. Had knee replacement 6 months ago. He presents to the office today for neurosurgical consultation for possible movement disorder. He was referred by Dr. Gilmore, and is under the care of neurologist Dr. Maynard. During his recovering from knee replacement surgery he would go for walks, and multiple times he felt like his body was speeding up and he couldn't control his pace. This most recently happened last month. In the past when it has occurred he had some alcoholic drinks which he thought was related. When this happened about a year ago he was evaluated by neurosurgeon Dr. Krishna for NPH. He ruled out NPH and the pt had no neurosurgical intervention and was referred to neurologist Dr. Maynard for further evaluation and management. He was hospitalized 1 month ago at [facility] for altered mental status and urinary incontinence. Spouse states the infectious workup was negative, he had no UTI, stroke workup was negative. No further episodes of confusion/incontinence in last month. After discharge he had DaTSCAN to rule out Parkinson's disease. This was completed 3/1/23 and was normal. He reports that his balance is very off, he is falling more frequently now than before, had 4 falls this month. He has been going to PT. He reports slowness when he first gets up in the morning. Spouse notices he has been a little slower overall. He reports left hand tremor when he is holding something. Denies family history of tremor. No correlation or tremor worsening/improving with stress, anxiety, caffeine, alcohol. Denies stiffness. No difficulty with ADLs.

## 2023-03-16 NOTE — RESULTS/DATA
[FreeTextEntry1] : EXAM: [id number] - NM BRAIN NERY SA SD - ORDERED BY: SYEDA AQUINO\par \par PROCEDURE DATE: 03/01/2023\par \par \par INTERPRETATION: CLINICAL INFORMATION: 73-year-old male with movement disorder; referred to evaluate for Parkinson's disease.\par \par RADIOPHARMACEUTICAL: 4.8 mCi I-123 Ioflupane, i.v.\par \par TECHNIQUE: SPECT imaging of the brain was performed approximately 3 hours after radiopharmaceutical injection. The patient was pretreated with Lugol's solution 1 hour prior to radiopharmaceutical administration.\par \par COMPARISON: None\par \par OTHER STUDIES USED FOR CORRELATION: None\par \par FINDINGS:\par Normal radiopharmaceutical uptake in the right caudate nucleus.\par Normal radiopharmaceutical uptake in the left caudate nucleus.\par Normal radiopharmaceutical uptake in the right putamen.\par Normal radiopharmaceutical uptake in the left putamen.\par \par IMPRESSION:\par \par Normal DaTscan.\par \par The findings do not support the diagnosis of Parkinson's disease or a Parkinsonian syndrome.\par \par --- End of Report ---\par \par \par SAMREEN JARAMILLO MD; Resident Radiologist\par This document has been electronically signed.\par JHON AVILES MD; Attending Nuclear Medicine\par This document has been electronically signed. Mar 1 2023 4:49PM\par \par

## 2023-03-16 NOTE — ASSESSMENT
[FreeTextEntry1] : IMPRESSION:\par 73M with PMH of HLD. Presents for neurosurgical consultation for possible movement disorder. P/w multiple falls in past month, feeling like body is speeding up to point he can't control it, left hand tremor when holding something. Feels slower overall. One episode of AMS/urinary incontinence leading to hospitalization 1 month ago, no further episodes. Feels balance is very off. \par \par On exam pt is A&Ox4, no resting, or postural tremor noted, no tremor when holding weighted mug and bringing to mouth. Able to tandem stance, stand with feet together, takes a couple steps out with tandem gait. Mild RUE rigidity. Slightly stooped posture and slight wide based gait, normal arm swing. \par \par PLAN:\par Referral to movement disorder neurologist for evaluation.\par \par \par \par I, Dr. Anson Kunz evaluated the patient with nurse practitioner Monika Guzman, and established the plan of care. I personally discussed this patient during the key portions of the history and exam with the nurse practitioner at the time of the visit. I agree with the assessment and plan as written, unless noted below.

## 2023-03-16 NOTE — DATA REVIEWED
[de-identified] : MRI brain 2/15/23- [facility] [de-identified] : CT head 2/15/23-[facility] [de-identified] : NM Brain DaTSCAN 3/1/23- [facility]

## 2023-03-16 NOTE — REVIEW OF SYSTEMS
[As Noted in HPI] : as noted in HPI [Negative] : Respiratory [Suicidal] : not suicidal [Joint Stiffness] : no joint stiffness [FreeTextEntry8] : urinary urgency, episode of urinary incontinence  [de-identified] : feels slowness daily

## 2023-03-20 ENCOUNTER — APPOINTMENT (OUTPATIENT)
Dept: NEUROLOGY | Facility: CLINIC | Age: 73
End: 2023-03-20

## 2023-03-22 ENCOUNTER — NON-APPOINTMENT (OUTPATIENT)
Age: 73
End: 2023-03-22

## 2023-03-22 ENCOUNTER — APPOINTMENT (OUTPATIENT)
Dept: NEUROLOGY | Facility: CLINIC | Age: 73
End: 2023-03-22
Payer: MEDICARE

## 2023-03-22 VITALS
WEIGHT: 239 LBS | BODY MASS INDEX: 33.46 KG/M2 | SYSTOLIC BLOOD PRESSURE: 133 MMHG | HEIGHT: 71 IN | DIASTOLIC BLOOD PRESSURE: 80 MMHG | HEART RATE: 66 BPM

## 2023-03-22 PROCEDURE — 99215 OFFICE O/P EST HI 40 MIN: CPT

## 2023-03-23 NOTE — DISCUSSION/SUMMARY
[FreeTextEntry1] : Patient with a 1 year decline in gait with multiple falls.  He does not have autonomic symptoms at this time and cognitive faculties have been stable.  His neurological exam demonstrates a wide-based gait that is magnetic in quality with unsteady turns.  His MRI shows enlarged ventricles alongside cortical atrophy however the extent of the ventriculomegaly raises suspicion for an underlying NPH process.  Further his DaTscan is normal which which speaks against a dopaminergic deficient state.\par \par Patient was counseled on the following recommendations:\par \par -Following a discussion with patient and his wife, I recommended that we seek a neurosurgical consultation for a possible lumbar drain to rule out NPH.  If this work-up is negative we will have to consider an underlying neurodegenerative process. \par -They were in agreement with this plan as well as getting MRI cervical spine to rule out myelopathy though my suspicion is low for this. \par \par I will follow-up with him after their consultation with neurosurgery and MRI is completed.\par \par \par .

## 2023-03-23 NOTE — PHYSICAL EXAM
[Person] : oriented to person [Place] : oriented to place [Time] : oriented to time [Cranial Nerves Oculomotor (III)] : extraocular motion intact [Cranial Nerves Facial (VII)] : face symmetrical [Cranial Nerves Accessory (XI - Cranial And Spinal)] : head turning and shoulder shrug symmetric [Cranial Nerves Hypoglossal (XII)] : there was no tongue deviation with protrusion [Motor Strength] : muscle strength was normal in all four extremities [Involuntary Movements] : no involuntary movements were seen [Sensation Tactile Decrease] : light touch was intact [1+] : Patella left 1+ [FreeTextEntry1] : There is no facial masking, speech is fluent.  Rapid movements do not decrement in the upper extremities.  There is moderate documentation on leg lifts and foot tapping bilaterally.  There is 1+ cogwheel rigidity.  There is no dysmetria on finger-to-nose.  He pushes to stand and walks with a wide-based station, strides were short and takes 3 steps to turn around.  He tandem is okay and pull test is negative.  There is no freezing of gait

## 2023-03-23 NOTE — HISTORY OF PRESENT ILLNESS
[FreeTextEntry1] : 73 year old male with multiple falls since September 2021. Last fall was 3 weeks, tripped while going up the stairs. He last slid out of bed about one week ago. At that time he was awake without LOC, he reports he did not fight it and slide down. He got up by himself. No CBD was used the night before.\par \par In February 2022 he had an episode of rushed gait and unsteadiness. Dr Cherry saw him on Feb 14th and ordered a NERY scan. That night he had an incident where he vomited, urinary incontinence and was taken to [facility] for evaluation. Since this hospitalization he feels he has "regressed" in his mobility.  When he walks long distances he develops bouts of festination.  His overall walking has slowed and turns are more unsteady.  He participates in PT 2x per week. Denies tremors. The episodes of rushed gait and unsteadiness does not occur every day. Per his wife he has been less physically active since 2022 \par \par Reports a "lack of clarity" and wife notices he is more apathetic and less motivated to do thing like play golf in the last couple years. This led to sadness because it was something he looked forward to doing when he retired. Denies daily episodes of anxiety, worry or tension. Denies feelings of depression, sadness or hopelessness. Denies suicidal ideation. \par \par He denies any episodes of incontinence since February 2022. Denies changes in sense of smell or volume of voice. \par \par Surgical hx: August 2022 left knee replacement\par \par Nonmotor\par There is no RBD\par Occasional constipation\par No anosmia\par \par Medications:\par Atorvastatin\par CBD Gummies PRN \par Tylenol PRN

## 2023-03-23 NOTE — DATA REVIEWED
[de-identified] : Enlarged ventricles with evidence of cortical atrophy.  Parietal-occipital foci on sagittal cuts seem compressed in quality compared to frontal foci.  Frontal horns of the ventricles are also bulged.  [de-identified] : DaTscan is normal

## 2023-03-24 ENCOUNTER — APPOINTMENT (OUTPATIENT)
Dept: NEUROLOGY | Facility: CLINIC | Age: 73
End: 2023-03-24
Payer: MEDICARE

## 2023-03-24 VITALS
DIASTOLIC BLOOD PRESSURE: 85 MMHG | HEART RATE: 61 BPM | HEIGHT: 71 IN | BODY MASS INDEX: 33.46 KG/M2 | WEIGHT: 239 LBS | SYSTOLIC BLOOD PRESSURE: 128 MMHG

## 2023-03-24 DIAGNOSIS — R41.3 OTHER AMNESIA: ICD-10-CM

## 2023-03-24 DIAGNOSIS — R26.89 OTHER ABNORMALITIES OF GAIT AND MOBILITY: ICD-10-CM

## 2023-03-24 PROCEDURE — 96132 NRPSYC TST EVAL PHYS/QHP 1ST: CPT | Mod: 59

## 2023-03-24 PROCEDURE — 99214 OFFICE O/P EST MOD 30 MIN: CPT

## 2023-03-24 NOTE — REVIEW OF SYSTEMS
[Memory Lapses or Loss] : memory loss [Fever] : no fever [Chills] : no chills [Feeling Tired] : not feeling tired [Sleep Disturbances] : no sleep disturbances [Anxiety] : no anxiety [Depression] : no depression [Confused or Disoriented] : no confusion [Dizziness] : no dizziness [Lightheadedness] : no lightheadedness [Eye Pain] : no eye pain [Loss Of Hearing] : no hearing loss [Heart Rate Is Fast] : the heart rate was not fast [Chest Pain] : no chest pain [Palpitations] : no palpitations [Shortness Of Breath] : no shortness of breath [Wheezing] : no wheezing [Abdominal Pain] : no abdominal pain [Skin Lesions] : no skin lesions [Skin Wound] : no skin wound [Swollen Glands] : no swollen glands

## 2023-03-24 NOTE — DISCUSSION/SUMMARY
[FreeTextEntry1] : 73-year-old male with history of memory difficulty and gait disturbances, neuro tracks completed and within normal limits with no sections greater than 1 standard deviation.  Patient has seen neurosurgery and referred to movement disorder specialist, DaTscan was negative for Parkinson's or parkinsonian syndrome\par \par #Balance problem\par #Memory\par \par 1.  Follow-up with movement disorder specialist as scheduled\par 2.  Return to clinic in 3 months for follow-up evaluation\par 3.  Repeat neuro tracks in 1 year

## 2023-03-24 NOTE — HISTORY OF PRESENT ILLNESS
[FreeTextEntry1] : 73-year-old male returns to clinic for follow-up evaluation for neurotrax testing to evaluate cognitive function.  MoCA was 29/30 neuro tracks results Global cognitive score 108 memory 89.6 executive function 98.9 attention 108.8 information processing speed 129.1 visual-spatial 127.2 verbal function 104.6 and motor skills 98.1.  Patient continues with physical therapy for gait and balance training and has seen neurosurgery for evaluation for gait instability DaTscan was unremarkable and negative for diagnosis of Parkinson's disease or parkinsonian syndrome.  And patient was referred to a movement disorder specialist in which she has an appointment in April 2023.

## 2023-03-29 ENCOUNTER — APPOINTMENT (OUTPATIENT)
Dept: MRI IMAGING | Facility: CLINIC | Age: 73
End: 2023-03-29
Payer: MEDICARE

## 2023-03-29 ENCOUNTER — OUTPATIENT (OUTPATIENT)
Dept: OUTPATIENT SERVICES | Facility: HOSPITAL | Age: 73
LOS: 1 days | End: 2023-03-29
Payer: COMMERCIAL

## 2023-03-29 ENCOUNTER — APPOINTMENT (OUTPATIENT)
Dept: SPINE | Facility: CLINIC | Age: 73
End: 2023-03-29

## 2023-03-29 ENCOUNTER — APPOINTMENT (OUTPATIENT)
Dept: SPINE | Facility: CLINIC | Age: 73
End: 2023-03-29
Payer: MEDICARE

## 2023-03-29 VITALS
BODY MASS INDEX: 33.46 KG/M2 | SYSTOLIC BLOOD PRESSURE: 118 MMHG | HEIGHT: 71 IN | WEIGHT: 239 LBS | HEART RATE: 62 BPM | DIASTOLIC BLOOD PRESSURE: 75 MMHG | OXYGEN SATURATION: 96 %

## 2023-03-29 DIAGNOSIS — R26.89 OTHER ABNORMALITIES OF GAIT AND MOBILITY: ICD-10-CM

## 2023-03-29 DIAGNOSIS — Z98.890 OTHER SPECIFIED POSTPROCEDURAL STATES: Chronic | ICD-10-CM

## 2023-03-29 PROCEDURE — 72141 MRI NECK SPINE W/O DYE: CPT

## 2023-03-29 PROCEDURE — 99214 OFFICE O/P EST MOD 30 MIN: CPT

## 2023-03-29 PROCEDURE — 72141 MRI NECK SPINE W/O DYE: CPT | Mod: 26

## 2023-04-04 ENCOUNTER — NON-APPOINTMENT (OUTPATIENT)
Age: 73
End: 2023-04-04

## 2023-04-04 LAB
ANION GAP SERPL CALC-SCNC: 13 MMOL/L
APTT BLD: 30.2 SEC
BUN SERPL-MCNC: 20 MG/DL
CALCIUM SERPL-MCNC: 9.7 MG/DL
CHLORIDE SERPL-SCNC: 103 MMOL/L
CO2 SERPL-SCNC: 24 MMOL/L
CREAT SERPL-MCNC: 1.18 MG/DL
EGFR: 65 ML/MIN/1.73M2
GLUCOSE SERPL-MCNC: 90 MG/DL
HCT VFR BLD CALC: 48.6 %
HGB BLD-MCNC: 15.7 G/DL
INR PPP: 0.98 RATIO
MCHC RBC-ENTMCNC: 31.5 PG
MCHC RBC-ENTMCNC: 32.3 GM/DL
MCV RBC AUTO: 97.4 FL
PLATELET # BLD AUTO: 280 K/UL
POTASSIUM SERPL-SCNC: 4.6 MMOL/L
PT BLD: 11.4 SEC
RBC # BLD: 4.99 M/UL
RBC # FLD: 14.8 %
SODIUM SERPL-SCNC: 140 MMOL/L
WBC # FLD AUTO: 6.97 K/UL

## 2023-04-05 ENCOUNTER — APPOINTMENT (OUTPATIENT)
Dept: NEUROLOGY | Facility: CLINIC | Age: 73
End: 2023-04-05
Payer: MEDICARE

## 2023-04-05 PROCEDURE — 96133 NRPSYC TST EVAL PHYS/QHP EA: CPT

## 2023-04-05 PROCEDURE — 96139 PSYCL/NRPSYC TST TECH EA: CPT

## 2023-04-05 PROCEDURE — 96138 PSYCL/NRPSYC TECH 1ST: CPT

## 2023-04-05 PROCEDURE — 96116 NUBHVL XM PHYS/QHP 1ST HR: CPT

## 2023-04-05 PROCEDURE — 96132 NRPSYC TST EVAL PHYS/QHP 1ST: CPT

## 2023-04-10 ENCOUNTER — NON-APPOINTMENT (OUTPATIENT)
Age: 73
End: 2023-04-10

## 2023-04-10 LAB — SARS-COV-2 N GENE NPH QL NAA+PROBE: NOT DETECTED

## 2023-04-11 ENCOUNTER — INPATIENT (INPATIENT)
Facility: HOSPITAL | Age: 73
LOS: 2 days | Discharge: HOME | DRG: 57 | End: 2023-04-14
Attending: NEUROLOGICAL SURGERY | Admitting: NEUROLOGICAL SURGERY
Payer: MEDICARE

## 2023-04-11 ENCOUNTER — APPOINTMENT (OUTPATIENT)
Dept: RADIOLOGY | Facility: HOSPITAL | Age: 73
End: 2023-04-11

## 2023-04-11 VITALS
TEMPERATURE: 98 F | HEART RATE: 63 BPM | DIASTOLIC BLOOD PRESSURE: 85 MMHG | OXYGEN SATURATION: 96 % | SYSTOLIC BLOOD PRESSURE: 144 MMHG | RESPIRATION RATE: 18 BRPM

## 2023-04-11 DIAGNOSIS — Z98.890 OTHER SPECIFIED POSTPROCEDURAL STATES: Chronic | ICD-10-CM

## 2023-04-11 DIAGNOSIS — G91.2 (IDIOPATHIC) NORMAL PRESSURE HYDROCEPHALUS: ICD-10-CM

## 2023-04-11 LAB
ALBUMIN SERPL ELPH-MCNC: 4.3 G/DL — SIGNIFICANT CHANGE UP (ref 3.3–5)
ALP SERPL-CCNC: 71 U/L — SIGNIFICANT CHANGE UP (ref 40–120)
ALT FLD-CCNC: 31 U/L — SIGNIFICANT CHANGE UP (ref 10–45)
ANION GAP SERPL CALC-SCNC: 13 MMOL/L — SIGNIFICANT CHANGE UP (ref 5–17)
APTT BLD: 29 SEC — SIGNIFICANT CHANGE UP (ref 27.5–35.5)
AST SERPL-CCNC: 24 U/L — SIGNIFICANT CHANGE UP (ref 10–40)
BASOPHILS # BLD AUTO: 0.08 K/UL — SIGNIFICANT CHANGE UP (ref 0–0.2)
BASOPHILS NFR BLD AUTO: 1.2 % — SIGNIFICANT CHANGE UP (ref 0–2)
BILIRUB SERPL-MCNC: 0.4 MG/DL — SIGNIFICANT CHANGE UP (ref 0.2–1.2)
BLD GP AB SCN SERPL QL: NEGATIVE — SIGNIFICANT CHANGE UP
BUN SERPL-MCNC: 24 MG/DL — HIGH (ref 7–23)
CALCIUM SERPL-MCNC: 9.1 MG/DL — SIGNIFICANT CHANGE UP (ref 8.4–10.5)
CHLORIDE SERPL-SCNC: 105 MMOL/L — SIGNIFICANT CHANGE UP (ref 96–108)
CO2 SERPL-SCNC: 21 MMOL/L — LOW (ref 22–31)
CREAT SERPL-MCNC: 1.04 MG/DL — SIGNIFICANT CHANGE UP (ref 0.5–1.3)
EGFR: 76 ML/MIN/1.73M2 — SIGNIFICANT CHANGE UP
EOSINOPHIL # BLD AUTO: 0.32 K/UL — SIGNIFICANT CHANGE UP (ref 0–0.5)
EOSINOPHIL NFR BLD AUTO: 4.9 % — SIGNIFICANT CHANGE UP (ref 0–6)
GLUCOSE SERPL-MCNC: 98 MG/DL — SIGNIFICANT CHANGE UP (ref 70–99)
HCT VFR BLD CALC: 46.1 % — SIGNIFICANT CHANGE UP (ref 39–50)
HGB BLD-MCNC: 15 G/DL — SIGNIFICANT CHANGE UP (ref 13–17)
IMM GRANULOCYTES NFR BLD AUTO: 0.5 % — SIGNIFICANT CHANGE UP (ref 0–0.9)
INR BLD: 1 RATIO — SIGNIFICANT CHANGE UP (ref 0.88–1.16)
LYMPHOCYTES # BLD AUTO: 1.5 K/UL — SIGNIFICANT CHANGE UP (ref 1–3.3)
LYMPHOCYTES # BLD AUTO: 23 % — SIGNIFICANT CHANGE UP (ref 13–44)
MCHC RBC-ENTMCNC: 30.2 PG — SIGNIFICANT CHANGE UP (ref 27–34)
MCHC RBC-ENTMCNC: 32.5 GM/DL — SIGNIFICANT CHANGE UP (ref 32–36)
MCV RBC AUTO: 92.9 FL — SIGNIFICANT CHANGE UP (ref 80–100)
MONOCYTES # BLD AUTO: 0.54 K/UL — SIGNIFICANT CHANGE UP (ref 0–0.9)
MONOCYTES NFR BLD AUTO: 8.3 % — SIGNIFICANT CHANGE UP (ref 2–14)
NEUTROPHILS # BLD AUTO: 4.05 K/UL — SIGNIFICANT CHANGE UP (ref 1.8–7.4)
NEUTROPHILS NFR BLD AUTO: 62.1 % — SIGNIFICANT CHANGE UP (ref 43–77)
NRBC # BLD: 0 /100 WBCS — SIGNIFICANT CHANGE UP (ref 0–0)
PLATELET # BLD AUTO: 276 K/UL — SIGNIFICANT CHANGE UP (ref 150–400)
POTASSIUM SERPL-MCNC: 4.2 MMOL/L — SIGNIFICANT CHANGE UP (ref 3.5–5.3)
POTASSIUM SERPL-SCNC: 4.2 MMOL/L — SIGNIFICANT CHANGE UP (ref 3.5–5.3)
PROT SERPL-MCNC: 7.1 G/DL — SIGNIFICANT CHANGE UP (ref 6–8.3)
PROTHROM AB SERPL-ACNC: 11.6 SEC — SIGNIFICANT CHANGE UP (ref 10.5–13.4)
RBC # BLD: 4.96 M/UL — SIGNIFICANT CHANGE UP (ref 4.2–5.8)
RBC # FLD: 14.4 % — SIGNIFICANT CHANGE UP (ref 10.3–14.5)
RH IG SCN BLD-IMP: POSITIVE — SIGNIFICANT CHANGE UP
RH IG SCN BLD-IMP: POSITIVE — SIGNIFICANT CHANGE UP
SODIUM SERPL-SCNC: 139 MMOL/L — SIGNIFICANT CHANGE UP (ref 135–145)
WBC # BLD: 6.52 K/UL — SIGNIFICANT CHANGE UP (ref 3.8–10.5)
WBC # FLD AUTO: 6.52 K/UL — SIGNIFICANT CHANGE UP (ref 3.8–10.5)

## 2023-04-11 PROCEDURE — 99223 1ST HOSP IP/OBS HIGH 75: CPT

## 2023-04-11 PROCEDURE — 62329 THER SPI PNXR CSF FLUOR/CT: CPT

## 2023-04-11 RX ORDER — ONDANSETRON 8 MG/1
4 TABLET, FILM COATED ORAL EVERY 6 HOURS
Refills: 0 | Status: DISCONTINUED | OUTPATIENT
Start: 2023-04-11 | End: 2023-04-14

## 2023-04-11 RX ORDER — ACETAMINOPHEN 500 MG
650 TABLET ORAL EVERY 6 HOURS
Refills: 0 | Status: DISCONTINUED | OUTPATIENT
Start: 2023-04-11 | End: 2023-04-14

## 2023-04-11 RX ORDER — POLYETHYLENE GLYCOL 3350 17 G/17G
17 POWDER, FOR SOLUTION ORAL DAILY
Refills: 0 | Status: DISCONTINUED | OUTPATIENT
Start: 2023-04-11 | End: 2023-04-14

## 2023-04-11 RX ORDER — SENNA PLUS 8.6 MG/1
2 TABLET ORAL AT BEDTIME
Refills: 0 | Status: DISCONTINUED | OUTPATIENT
Start: 2023-04-11 | End: 2023-04-14

## 2023-04-11 RX ORDER — PANTOPRAZOLE SODIUM 20 MG/1
40 TABLET, DELAYED RELEASE ORAL
Refills: 0 | Status: DISCONTINUED | OUTPATIENT
Start: 2023-04-11 | End: 2023-04-14

## 2023-04-11 RX ORDER — SODIUM CHLORIDE 9 MG/ML
3 INJECTION INTRAMUSCULAR; INTRAVENOUS; SUBCUTANEOUS EVERY 8 HOURS
Refills: 0 | Status: DISCONTINUED | OUTPATIENT
Start: 2023-04-11 | End: 2023-04-14

## 2023-04-11 RX ORDER — ATORVASTATIN CALCIUM 80 MG/1
10 TABLET, FILM COATED ORAL AT BEDTIME
Refills: 0 | Status: DISCONTINUED | OUTPATIENT
Start: 2023-04-11 | End: 2023-04-14

## 2023-04-11 RX ADMIN — Medication 650 MILLIGRAM(S): at 15:07

## 2023-04-11 RX ADMIN — SENNA PLUS 2 TABLET(S): 8.6 TABLET ORAL at 21:17

## 2023-04-11 RX ADMIN — Medication 650 MILLIGRAM(S): at 20:53

## 2023-04-11 RX ADMIN — SODIUM CHLORIDE 3 MILLILITER(S): 9 INJECTION INTRAMUSCULAR; INTRAVENOUS; SUBCUTANEOUS at 21:27

## 2023-04-11 RX ADMIN — SODIUM CHLORIDE 3 MILLILITER(S): 9 INJECTION INTRAMUSCULAR; INTRAVENOUS; SUBCUTANEOUS at 13:54

## 2023-04-11 RX ADMIN — Medication 650 MILLIGRAM(S): at 21:47

## 2023-04-11 RX ADMIN — ATORVASTATIN CALCIUM 10 MILLIGRAM(S): 80 TABLET, FILM COATED ORAL at 21:17

## 2023-04-11 RX ADMIN — POLYETHYLENE GLYCOL 3350 17 GRAM(S): 17 POWDER, FOR SOLUTION ORAL at 12:46

## 2023-04-11 RX ADMIN — Medication 650 MILLIGRAM(S): at 16:00

## 2023-04-11 NOTE — OCCUPATIONAL THERAPY INITIAL EVALUATION ADULT - LIVES WITH, PROFILE
Pvt home, 4 steps to enter without handrail. 1 flight to bed and bath. (I) ADLs and functional transfers without AD/DME. +/spouse

## 2023-04-11 NOTE — PHYSICAL THERAPY INITIAL EVALUATION ADULT - TIMED UP AND GO TEST, REHAB EVAL
trial 1-12.07 seconds 3 steps to turn, trial 2-11.5 seconds 4 steps to turn, trial 3-15.71 seconds 5 steps to turn, trial 4-13.37 seconds 4 steps to turn. AVG 12.3 seconds 4 steps to turn

## 2023-04-11 NOTE — OCCUPATIONAL THERAPY INITIAL EVALUATION ADULT - COGNITIVE, VISUAL PERCEPTUAL, OT EVAL
Pt will complete line tracing task with less than 34 errors in 2 weeks. Pt will complete serial dotting with less than 30 errors in 2 weeks.

## 2023-04-11 NOTE — H&P ADULT - NSHPPHYSICALEXAM_GEN_ALL_CORE
GENERAL: patient appears well, no acute distress, appropriate, pleasant  LUNGS: clear to auscultation  HEART:  regular rate and rhythm  GASTROINTESTINAL: abdomen is soft, nontender, nondistended,   INTEGUMENT: good skin turgor, no lesions noted  MUSCULOSKELETAL: no clubbing or cyanosis, no obvious deformity  NEUROLOGIC: awake, alert, oriented x3, EOMI, PERRLA, CN II-XII intact, strength 5/5 in all extremities,  no obvious sensory deficits

## 2023-04-11 NOTE — H&P ADULT - NSHPREVIEWOFSYSTEMS_GEN_ALL_CORE
Review of Systems: CONSTITUTIONAL: denies fever, chills, fatigue, weakness  HEENT: denies blurred vision, sore throat  SKIN: denies new lesions, rash  CARDIOVASCULAR: denies chest pain, chest pressure, palpitations  RESPIRATORY: denies shortness of breath, sputum production  GASTROINTESTINAL: denies nausea, vomiting, diarrhea, abdominal pain  GENITOURINARY: denies dysuria, discharge  NEUROLOGICAL: denies numbness, headache, focal weakness  MUSCULOSKELETAL: denies new joint pain, muscle aches  HEMATOLOGIC: denies gross bleeding, bruising  LYMPHATICS: denies enlarged lymph nodes, extremity swelling  PSYCHIATRIC: denies recent changes in anxiety, depression  ENDOCRINOLOGIC: denies sweating, cold or heat intolerance

## 2023-04-11 NOTE — PHYSICAL THERAPY INITIAL EVALUATION ADULT - PERTINENT HX OF CURRENT PROBLEM, REHAB EVAL
72 y/o male with PMhx of left knee replacement, HLD presents today for lumbar drain placement for NPH trial. Pt stil has persistent imbalance and trips going upstairs, falls off the bed and some episodes of urinary incontinence

## 2023-04-11 NOTE — PHYSICAL THERAPY INITIAL EVALUATION ADULT - ADDITIONAL COMMENTS
Pt lives in a private home with spouse there are 5 steps to enter, 15 steps to bedroom. Pt performed ADL/IADLs independently. Ambulates with no AD.

## 2023-04-11 NOTE — H&P ADULT - ASSESSMENT
72 y/o male with PMhx of left knee replacement, HLD presents today for lumbar drain placement for NPH trial. Pt stil has persistent imbalance and trips going upstairs, falls off the bed and some episodes of urinary incontinence 74 y/o male with PMhx of left knee replacement, HLD presents today for lumbar drain placement for NPH trial. Pt stil has persistent imbalance and trips going upstairs, falls off the bed and some episodes of urinary incontinence      Plan:  Neuro and Vitals Q4H  Monitor CSF drainage  Lumbar placement pending  OT/PT assessment  Continue home medications 74 y/o male with PMhx of left knee replacement, HLD presents today for lumbar drain placement for NPH trial. Pt stil has persistent imbalance and trips going upstairs, falls off the bed and some episodes of urinary incontinence      Plan:  Neuro and Vitals Q4H  Monitor CSF drainage as per LD/NPH protocol  Lumbar drain placement in IR pending  OT/PT assessment  Continue home medications  Pain Control

## 2023-04-11 NOTE — PATIENT PROFILE ADULT - FOOD INSECURITY
You can access the FollowMyHealth Patient Portal offered by [facility] by registering at the following website: [email or web address]. By joining [employer]’s FollowMyHealth portal, you will also be able to view your health information using other applications (apps) compatible with our system. no

## 2023-04-11 NOTE — OCCUPATIONAL THERAPY INITIAL EVALUATION ADULT - RANGE OF MOTION EXAMINATION, UPPER EXTREMITY
except R digit V due to dupuytren contracture/bilateral UE Active ROM was WNL (within normal limits)

## 2023-04-11 NOTE — OCCUPATIONAL THERAPY INITIAL EVALUATION ADULT - PERTINENT HX OF CURRENT PROBLEM, REHAB EVAL
72 y/o male with PMHx of HLD, and left knee replacement presents for Lumbar drain placement today with  4/11/2023. As per starting in 2021, Pt's wife states pt has escalating pacing while walking and wouldn't be able to stop unless he grabbed onto something. Pt also had multiple episodes of tripping upstairs and sliding off the bed at night. Pt also has urinary incontinence and urgency without noticing it. Pt also had multiple episodes of intermittent forgetfulness. In March of 2022, pt had MRI done at [place] [facility] showing hydrocephalus. Pt has been involved with a neurologist  and movement specialist, Dr Jordan.

## 2023-04-11 NOTE — PATIENT PROFILE ADULT - FALL HARM RISK - HARM RISK INTERVENTIONS
Assistance OOB with selected safe patient handling equipment/Communicate Risk of Fall with Harm to all staff/Discuss with provider need for PT consult/Monitor gait and stability/Provide patient with walking aids - walker, cane, crutches/Reinforce activity limits and safety measures with patient and family/Tailored Fall Risk Interventions/Use of alarms - bed, chair and/or voice tab/Visual Cue: Yellow wristband and red socks/Bed in lowest position, wheels locked, appropriate side rails in place/Call bell, personal items and telephone in reach/Instruct patient to call for assistance before getting out of bed or chair/Non-slip footwear when patient is out of bed/[place] to call system/Physically safe environment - no spills, clutter or unnecessary equipment/Purposeful Proactive Rounding/Room/bathroom lighting operational, light cord in reach

## 2023-04-11 NOTE — H&P ADULT - HISTORY OF PRESENT ILLNESS
72 y/o male with PMHx of HLD, and left knee replacement presents for Lumbar drain placement today with  4/11/2023. As per starting in 2021, Pt's wife states pt has escalating pacing while walking and wouldn't be able to stop unless he grabbed onto something. Pt also had multiple episodes of tripping upstairs and sliding off the bed at night. Pt also has urinary incontinence and urgency without noticing it. Pt also had multiple episodes of intermittent forgetfulness. In March of 2022, pt had MRI done at [place] [facility] showing hydrocephalus. Pt has been involved with a neurologist  and movement specialist, Dr Jordan.    Pt currently in chair, getting blood drawed in no acute distress.

## 2023-04-12 PROCEDURE — 99232 SBSQ HOSP IP/OBS MODERATE 35: CPT

## 2023-04-12 RX ORDER — ENOXAPARIN SODIUM 100 MG/ML
40 INJECTION SUBCUTANEOUS
Refills: 0 | Status: DISCONTINUED | OUTPATIENT
Start: 2023-04-12 | End: 2023-04-13

## 2023-04-12 RX ADMIN — SODIUM CHLORIDE 3 MILLILITER(S): 9 INJECTION INTRAMUSCULAR; INTRAVENOUS; SUBCUTANEOUS at 15:05

## 2023-04-12 RX ADMIN — SODIUM CHLORIDE 3 MILLILITER(S): 9 INJECTION INTRAMUSCULAR; INTRAVENOUS; SUBCUTANEOUS at 07:00

## 2023-04-12 RX ADMIN — SODIUM CHLORIDE 3 MILLILITER(S): 9 INJECTION INTRAMUSCULAR; INTRAVENOUS; SUBCUTANEOUS at 21:35

## 2023-04-12 RX ADMIN — ATORVASTATIN CALCIUM 10 MILLIGRAM(S): 80 TABLET, FILM COATED ORAL at 21:34

## 2023-04-12 NOTE — PROGRESS NOTE ADULT - ASSESSMENT
HPI:  74 y/o male with PMHx of HLD, and left knee replacement presents for Lumbar drain placement today with  4/11/2023. As per starting in 2021, Pt's wife states pt has escalating pacing while walking and wouldn't be able to stop unless he grabbed onto something. Pt also had multiple episodes of tripping upstairs and sliding off the bed at night. Pt also has urinary incontinence and urgency without noticing it. Pt also had multiple episodes of intermittent forgetfulness. In March of 2022, pt had MRI done at [place] [facility] showing hydrocephalus. Pt has been involved with a neurologist  and movement specialist, Dr Jordan.  4/11 s/p LD placement for NPH trial    PLAN:  Neuro:   - drain 20cc q 4 hrs   - daily PT  - LD to be removed tomorrow night  Respiratory:   - satting well on room air  CV:  - vitals stable  - on statin  DVT ppx:   - venodynes while in bed  GI:    - bowel regimen  PT/OT:   - daily PT      [facility] # [id number]

## 2023-04-12 NOTE — PROGRESS NOTE ADULT - SUBJECTIVE AND OBJECTIVE BOX
SUBJECTIVE: Pt seen and examined, doing well , sitting up in chair, LD being drained 20cc q 4 hrs    OVERNIGHT EVENTS: none    Vital Signs Last 24 Hrs  T(C): 36.7 (12 Apr 2023 09:26), Max: 36.8 (11 Apr 2023 17:00)  T(F): 98 (12 Apr 2023 09:26), Max: 98.2 (11 Apr 2023 17:00)  HR: 70 (12 Apr 2023 09:26) (5 - 75)  BP: 123/74 (12 Apr 2023 09:26) (101/57 - 136/80)  BP(mean): --  RR: 18 (12 Apr 2023 09:26) (16 - 18)  SpO2: 94% (12 Apr 2023 09:26) (93% - 97%)    Parameters below as of 12 Apr 2023 09:26  Patient On (Oxygen Delivery Method): room air        PHYSICAL EXAM:    General: No Acute Distress     Neurological: Awake, alert oriented to person, place and time, Following Commands,  Face Symmetrical, Speech Fluent, Moving all extremities 5/5, No Drift,     Pulmonary: Clear to Auscultation, No Rales, No Rhonchi, No Wheezes     Cardiovascular: S1, S2, Regular Rate and Rhythm     Gastrointestinal: Soft, Nontender, Nondistended     Incision: LD drain site c/d/i- tegaderm reinforced    LABS:                        15.0   6.52  )-----------( 276      ( 11 Apr 2023 10:03 )             46.1    04-11    139  |  105  |  24<H>  ----------------------------<  98  4.2   |  21<L>  |  1.04    Ca    9.1      11 Apr 2023 10:03    TPro  7.1  /  Alb  4.3  /  TBili  0.4  /  DBili  x   /  AST  24  /  ALT  31  /  AlkPhos  71  04-11  PT/INR - ( 11 Apr 2023 10:03 )   PT: 11.6 sec;   INR: 1.00 ratio         PTT - ( 11 Apr 2023 10:03 )  PTT:29.0 sec      04-11 @ 07:01  -  04-12 @ 07:00  --------------------------------------------------------  IN: 0 mL / OUT: 101 mL / NET: -101 mL          MEDICATIONS:  Antibiotics:    Neuro:  acetaminophen     Tablet .. 650 milliGRAM(s) Oral every 6 hours PRN Temp greater or equal to 38C (100.4F), Mild Pain (1 - 3)  ondansetron Injectable 4 milliGRAM(s) IV Push every 6 hours PRN Nausea and/or Vomiting    Cardiac:    Pulm:    GI/:  pantoprazole    Tablet 40 milliGRAM(s) Oral before breakfast  polyethylene glycol 3350 17 Gram(s) Oral daily  senna 2 Tablet(s) Oral at bedtime    Other:   atorvastatin 10 milliGRAM(s) Oral at bedtime  sodium chloride 0.9% lock flush 3 milliLiter(s) IV Push every 8 hours    DIET: [x] Regular [] CCD [] Renal [] Puree [] Dysphagia [] Tube Feeds:     IMAGING:

## 2023-04-13 PROCEDURE — 99231 SBSQ HOSP IP/OBS SF/LOW 25: CPT

## 2023-04-13 PROCEDURE — 99232 SBSQ HOSP IP/OBS MODERATE 35: CPT

## 2023-04-13 RX ADMIN — SODIUM CHLORIDE 3 MILLILITER(S): 9 INJECTION INTRAMUSCULAR; INTRAVENOUS; SUBCUTANEOUS at 05:25

## 2023-04-13 RX ADMIN — ATORVASTATIN CALCIUM 10 MILLIGRAM(S): 80 TABLET, FILM COATED ORAL at 21:04

## 2023-04-13 RX ADMIN — SODIUM CHLORIDE 3 MILLILITER(S): 9 INJECTION INTRAMUSCULAR; INTRAVENOUS; SUBCUTANEOUS at 12:08

## 2023-04-13 RX ADMIN — SODIUM CHLORIDE 3 MILLILITER(S): 9 INJECTION INTRAMUSCULAR; INTRAVENOUS; SUBCUTANEOUS at 22:56

## 2023-04-13 NOTE — PROGRESS NOTE ADULT - SUBJECTIVE AND OBJECTIVE BOX
SUBJECTIVE: Pt seen and examined, doing well, sitting up in chair, LD being drained 20cc q 4 hrs; denies HAs    Vital Signs Last 24 Hrs  T(C): 36.6 (13 Apr 2023 13:27), Max: 37.3 (12 Apr 2023 17:05)  T(F): 97.9 (13 Apr 2023 13:27), Max: 99.1 (12 Apr 2023 17:05)  HR: 62 (13 Apr 2023 13:37) (60 - 73)  BP: 113/67 (13 Apr 2023 13:37) (108/69 - 134/87)  BP(mean): --  RR: 18 (13 Apr 2023 13:37) (18 - 18)  SpO2: 95% (13 Apr 2023 13:37) (93% - 98%)    Parameters below as of 13 Apr 2023 13:37  Patient On (Oxygen Delivery Method): room air    PHYSICAL EXAM:    General: No Acute Distress     Neurological: Awake, alert oriented to person, place and time, Following Commands,  Face Symmetrical, Speech Fluent, Moving all extremities 5/5, No Drift,     Pulmonary: Clear to Auscultation, No Rales, No Rhonchi, No Wheezes     Cardiovascular: S1, S2, Regular Rate and Rhythm     Gastrointestinal: Soft, Nontender, Nondistended     Incision: LD drain in place, dressing c/d/i    LABS: no new labs                  MEDICATIONS  (STANDING):  atorvastatin 10 milliGRAM(s) Oral at bedtime  enoxaparin Injectable 40 milliGRAM(s) SubCutaneous <User Schedule>  pantoprazole    Tablet 40 milliGRAM(s) Oral before breakfast  polyethylene glycol 3350 17 Gram(s) Oral daily  senna 2 Tablet(s) Oral at bedtime  sodium chloride 0.9% lock flush 3 milliLiter(s) IV Push every 8 hours    MEDICATIONS  (PRN):  acetaminophen     Tablet .. 650 milliGRAM(s) Oral every 6 hours PRN Temp greater or equal to 38C (100.4F), Mild Pain (1 - 3)  ondansetron Injectable 4 milliGRAM(s) IV Push every 6 hours PRN Nausea and/or Vomiting              DIET: [x] Regular [] CCD [] Renal [] Puree [] Dysphagia [] Tube Feeds:     IMAGING:

## 2023-04-13 NOTE — PROGRESS NOTE ADULT - ASSESSMENT
HPI:  74 y/o male with PMHx of HLD, and left knee replacement presents for Lumbar drain placement today with  4/11/2023. As per starting in 2021, Pt's wife states pt has escalating pacing while walking and wouldn't be able to stop unless he grabbed onto something. Pt also had multiple episodes of tripping upstairs and sliding off the bed at night. Pt also has urinary incontinence and urgency without noticing it. Pt also had multiple episodes of intermittent forgetfulness. In March of 2022, pt had MRI done at [place] [facility] showing hydrocephalus. Pt has been involved with a neurologist  and movement specialist, Dr Jordan.    4/11 s/p LD placement for NPH trial    PLAN:  Neuro:   - drain 20cc q 4 hrs; day#2 of 3 day trial  - daily PT  - LD to be removed tonight  Respiratory:   - satting well on room air  CV:  - vitals stable  - on statin  DVT ppx:   - venodynes while in bed  GI:    - bowel regimen  PT/OT:   - daily PT- outpt PT upon d/c  neuropsych appt as outpatient tomorrow after d/c  family updated at bedside     will discuss with Dr Campbell  [facility] # [id number]

## 2023-04-13 NOTE — PROGRESS NOTE ADULT - SUBJECTIVE AND OBJECTIVE BOX
pt seen 4/13/2023 at TIME- 11:20 AM  Neuroradiology Follow-Up Note    This is a 73y Male s/p lumbar drain placement on _________ in Neuroradiology     S: Patient seen and examined @ bedside. No complaints offered.     Medication:       Vitals:   T(F): 97.8, Max: 99.1 (17:05)  HR: 66  BP: 108/69  RR: 18  SpO2: 94%    Physical Exam:  General: Nontoxic, in NAD, A&O x3.  Abdomen: soft, NTND, no peritoneal signs.  Back:     Drain Device: Drain intact attached to ____. Dressing clean, dry, intact.    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     -continue global management per primary team  -monitor h/h; transfuse as needed  -trend vs/labs     Please call Neuroradiology at extension [phone] or [phone] and request the procedural Neuroradiology attending with any questions, concerns, or issues regarding above.       pt seen 4/13/2023 at TIME- 11:20 AM  Neuroradiology Follow-Up Note    This is a 73y Male s/p lumbar drain placement at the L1-L2 level on 4/11/2023 in Neuroradiology     S: Patient seen and examined @ bedside. No complaints offered. Pt denies headaches or lower back pain since the lumbar drain placement. He reported improved balance and that his foot contact with the floor has also improved since the drain placement.     < from: Xray Lumbar Puncture, Theraputic (04.11.23 @ 11:44) >  After obtaining informed consent, the patient was prepped and draped in   the usual sterile manner. Using fluoroscopic and on lumbar puncture was   performed at the L1-2 level using a 14-gauge spinal stylet.  A catheter    was successfully threaded in the subarachnoid space. The catheter was   affixed to the patient's back using a Tegaderm. The patient tolerated   procedure without complications and left the department in stable   condition.    < end of copied text >      Medication:       Vitals:   T(F): 97.8, Max: 99.1 (17:05)  HR: 66  BP: 108/69  RR: 18  SpO2: 94%    Physical Exam:  General: Nontoxic, in NAD, awake and alert seen sitting in chair at his bedside.  Lower Back: Drain Device: Drain intact attached to leg bag with stopcock in closed position. Dressing intact.      24hr Drain output: ____  I&O's Detail    OUT:    Drain (mL): 80 mL    Assessment/Plan:  73y Male admitted with PMH of HLD, and left knee replacement presents for Lumbar drain placement as per  and admitted on 4/11/2023. As per starting in 2021, Pt's wife states pt has escalating pacing while walking and wouldn't be able to stop unless he grabbed onto something. Pt also had multiple episodes of tripping upstairs and sliding off the bed at night. Pt also has urinary incontinence and urgency without noticing it. Pt also had multiple episodes of intermittent forgetfulness. In March of 2022, pt had MRI done at [place] [facility] showing hydrocephalus. Pt has been involved with a neurologist  and movement specialist, Dr Jordan. He is s/p lumbar drain placement at the L1-L2 level on 4/11/2023 in Neuroradiology.     -continue global management per Neurosurgery team  -Lumbar drain is signed out to Neurosurgery team for further management. Neuroradiology will sign off and please re-consult as needed.  -trend vs/labs     Please call Neuroradiology at extension [phone] or [phone] and request the procedural Neuroradiology attending with any questions, concerns, or issues regarding above.      ASHLYN Bailey  Available on [employer] Teams  Spectralink [phone]  Ext [phone]

## 2023-04-14 ENCOUNTER — APPOINTMENT (OUTPATIENT)
Dept: NEUROLOGY | Facility: CLINIC | Age: 73
End: 2023-04-14
Payer: MEDICARE

## 2023-04-14 ENCOUNTER — TRANSCRIPTION ENCOUNTER (OUTPATIENT)
Age: 73
End: 2023-04-14

## 2023-04-14 VITALS
OXYGEN SATURATION: 99 % | TEMPERATURE: 98 F | SYSTOLIC BLOOD PRESSURE: 105 MMHG | RESPIRATION RATE: 17 BRPM | HEART RATE: 103 BPM | DIASTOLIC BLOOD PRESSURE: 71 MMHG

## 2023-04-14 PROCEDURE — 97530 THERAPEUTIC ACTIVITIES: CPT

## 2023-04-14 PROCEDURE — 86900 BLOOD TYPING SEROLOGIC ABO: CPT

## 2023-04-14 PROCEDURE — 86850 RBC ANTIBODY SCREEN: CPT

## 2023-04-14 PROCEDURE — 97162 PT EVAL MOD COMPLEX 30 MIN: CPT

## 2023-04-14 PROCEDURE — 85730 THROMBOPLASTIN TIME PARTIAL: CPT

## 2023-04-14 PROCEDURE — 99233 SBSQ HOSP IP/OBS HIGH 50: CPT

## 2023-04-14 PROCEDURE — 70450 CT HEAD/BRAIN W/O DYE: CPT

## 2023-04-14 PROCEDURE — 96139 PSYCL/NRPSYC TST TECH EA: CPT

## 2023-04-14 PROCEDURE — 97129 THER IVNTJ 1ST 15 MIN: CPT

## 2023-04-14 PROCEDURE — 62329 THER SPI PNXR CSF FLUOR/CT: CPT

## 2023-04-14 PROCEDURE — 70450 CT HEAD/BRAIN W/O DYE: CPT | Mod: 26

## 2023-04-14 PROCEDURE — 85610 PROTHROMBIN TIME: CPT

## 2023-04-14 PROCEDURE — 86901 BLOOD TYPING SEROLOGIC RH(D): CPT

## 2023-04-14 PROCEDURE — 97116 GAIT TRAINING THERAPY: CPT

## 2023-04-14 PROCEDURE — 85025 COMPLETE CBC W/AUTO DIFF WBC: CPT

## 2023-04-14 PROCEDURE — 96133 NRPSYC TST EVAL PHYS/QHP EA: CPT

## 2023-04-14 PROCEDURE — 80053 COMPREHEN METABOLIC PANEL: CPT

## 2023-04-14 PROCEDURE — 97165 OT EVAL LOW COMPLEX 30 MIN: CPT

## 2023-04-14 RX ORDER — ACETAMINOPHEN 500 MG
2 TABLET ORAL
Qty: 0 | Refills: 0 | DISCHARGE
Start: 2023-04-14

## 2023-04-14 RX ORDER — POLYETHYLENE GLYCOL 3350 17 G/17G
17 POWDER, FOR SOLUTION ORAL
Qty: 0 | Refills: 0 | DISCHARGE
Start: 2023-04-14

## 2023-04-14 RX ORDER — ATORVASTATIN CALCIUM 80 MG/1
1 TABLET, FILM COATED ORAL
Qty: 0 | Refills: 0 | DISCHARGE

## 2023-04-14 RX ADMIN — Medication 650 MILLIGRAM(S): at 01:47

## 2023-04-14 RX ADMIN — Medication 650 MILLIGRAM(S): at 01:17

## 2023-04-14 RX ADMIN — SODIUM CHLORIDE 3 MILLILITER(S): 9 INJECTION INTRAMUSCULAR; INTRAVENOUS; SUBCUTANEOUS at 05:46

## 2023-04-14 NOTE — DISCHARGE NOTE PROVIDER - REASON FOR ADMISSION
admitted 4/11 for lumbar drain insertion for NPH (normal pressure hydrocephalus).    admitted 4/11 for lumbar drain insertion for NPH (normal pressure hydrocephalus).

## 2023-04-14 NOTE — DISCHARGE NOTE PROVIDER - NSDCFUSCHEDAPPT_GEN_ALL_CORE_FT
Wale Maynard Physician [facility]  NEUROLOGY [address]  Scheduled Appointment: 05/22/2023     [facility]  NEUROLOGY [address]  Scheduled Appointment: 04/14/2023    Wesley Campbell  [facility]  SPINECTR [address]  Scheduled Appointment: 05/03/2023    Wale Maynard  [facility]  NEUROLOGY [address]  Scheduled Appointment: 05/22/2023

## 2023-04-14 NOTE — DISCHARGE NOTE PROVIDER - CARE PROVIDER_API CALL
Wesley Campbell (MD)  Neurosurgery  [address], [address]  [place] [address]  Phone: [phone]  Fax: [phone]  Follow Up Time:

## 2023-04-14 NOTE — DISCHARGE NOTE NURSING/CASE MANAGEMENT/SOCIAL WORK - PATIENT PORTAL LINK FT
You can access the FollowMyHealth Patient Portal offered by [facility] by registering at the following website: [email or web address]. By joining [employer]’s FollowMyHealth portal, you will also be able to view your health information using other applications (apps) compatible with our system.

## 2023-04-14 NOTE — PROGRESS NOTE ADULT - SUBJECTIVE AND OBJECTIVE BOX
SUBJECTIVE: Pt seen and examined, doing well, sitting up in chair, denies HAs- LD removed overngiht without issues    Vital Signs Last 24 Hrs  T(C): 36.5 (14 Apr 2023 05:29), Max: 36.7 (13 Apr 2023 16:56)  T(F): 97.7 (14 Apr 2023 05:29), Max: 98.1 (13 Apr 2023 21:30)  HR: 103 (14 Apr 2023 05:29) (63 - 103)  BP: 105/71 (14 Apr 2023 05:29) (101/63 - 128/87)  BP(mean): --  RR: 17 (14 Apr 2023 05:29) (16 - 18)  SpO2: 99% (14 Apr 2023 05:29) (94% - 99%)    Parameters below as of 14 Apr 2023 05:29  Patient On (Oxygen Delivery Method): room air      PHYSICAL EXAM:    General: No Acute Distress     Neurological: Awake, alert oriented to person, place and time, Following Commands,  Face Symmetrical, Speech Fluent, Moving all extremities 5/5, No Drift,     Pulmonary: Clear to Auscultation, No Rales, No Rhonchi, No Wheezes     Cardiovascular: S1, S2, Regular Rate and Rhythm     Gastrointestinal: Soft, Nontender, Nondistended     Incision: +dressing c/d/i    LABS: no new labs                  MEDICATIONS  (STANDING):  atorvastatin 10 milliGRAM(s) Oral at bedtime  enoxaparin Injectable 40 milliGRAM(s) SubCutaneous <User Schedule>  pantoprazole    Tablet 40 milliGRAM(s) Oral before breakfast  polyethylene glycol 3350 17 Gram(s) Oral daily  senna 2 Tablet(s) Oral at bedtime  sodium chloride 0.9% lock flush 3 milliLiter(s) IV Push every 8 hours    MEDICATIONS  (PRN):  acetaminophen     Tablet .. 650 milliGRAM(s) Oral every 6 hours PRN Temp greater or equal to 38C (100.4F), Mild Pain (1 - 3)  ondansetron Injectable 4 milliGRAM(s) IV Push every 6 hours PRN Nausea and/or Vomiting      DIET: [x] Regular [] CCD [] Renal [] Puree [] Dysphagia [] Tube Feeds:     IMAGING:

## 2023-04-14 NOTE — DISCHARGE NOTE PROVIDER - NSDCMRMEDTOKEN_GEN_ALL_CORE_FT
acetaminophen 325 mg oral tablet: 2 tab(s) orally every 6 hours As needed Temp greater or equal to 38C (100.4F), Mild Pain (1 - 3)  Aspirin Low Dose 81 mg oral delayed release tablet: 1 tab(s) orally once a day   atorvastatin 20 mg oral tablet: 1 tab(s) orally once a day  Multiple Vitamins oral tablet: 1 tab(s) orally once a day  polyethylene glycol 3350 oral powder for reconstitution: 17 gram(s) orally once a day as needed for  constipation  senna leaf extract oral tablet: 2 tab(s) orally once a day (at bedtime)

## 2023-04-14 NOTE — CHART NOTE - NSCHARTNOTEFT_GEN_A_CORE
In usual sterile fashion, lumbar drain taken out with patient in lateral position. Drain exit site secured with 3-0 Biosyn suture. Patient tolerated well.

## 2023-04-14 NOTE — DISCHARGE NOTE NURSING/CASE MANAGEMENT/SOCIAL WORK - NSDCFUADDAPPT_GEN_ALL_CORE_FT
You may remove back dressing after shower tomorrow and pat dry and leave open to air. You have an absorbable suture at lumbar drain site. Please keep clean and dry.     Please follow up with Dr Campbell in office in 1-2 weeks for wound check and to schedule placement of ventriculoperitoneal shunt.

## 2023-04-14 NOTE — DISCHARGE NOTE PROVIDER - NSDCFUADDINST_GEN_ALL_CORE_FT
please notify physician if fevers, bleeding, swelling, pain not relieved by medication, increased sluggishness or irritability, increased nausea or vomiting, inability to tolerate foods or liquids, new or increasing weakness/numbness/tingling.   please do not engage in strenuous activity, heavy lifting, drive, or return to work or school until cleared by surgeon.   please keep incision clean and dry, do not submerge wound in water for prolonged periods of time, pat dry after showering, and do not use any creams or ointments to incision.

## 2023-04-14 NOTE — PROGRESS NOTE ADULT - REASON FOR ADMISSION
lumbar drain insertion for NPH trial
admitted 4/11 for lumbar drain insertion in IR for NPH trial; Day#2
lumbar drain insertion for NPH trial
lumbar drain insertion for NPH trial
Show Applicator Variable?: Yes
Duration Of Freeze Thaw-Cycle (Seconds): 5
Post-Care Instructions: I reviewed with the patient in detail post-care instructions. Patient is to wear sunprotection, and avoid picking at any of the treated lesions. Pt may apply Vaseline to crusted or scabbing areas.
Render Note In Bullet Format When Appropriate: No
Number Of Freeze-Thaw Cycles: 2 freeze-thaw cycles
Consent: The patient's consent was obtained including but not limited to risks of crusting, scabbing, blistering, scarring, darker or lighter pigmentary change, recurrence, incomplete removal and infection.
Detail Level: Detailed

## 2023-04-14 NOTE — PROGRESS NOTE ADULT - ASSESSMENT
HPI:  74 y/o male with PMHx of HLD, and left knee replacement presents for Lumbar drain placement today with  4/11/2023. As per starting in 2021, Pt's wife states pt has escalating pacing while walking and wouldn't be able to stop unless he grabbed onto something. Pt also had multiple episodes of tripping upstairs and sliding off the bed at night. Pt also has urinary incontinence and urgency without noticing it. Pt also had multiple episodes of intermittent forgetfulness. In March of 2022, pt had MRI done at [facility] [facility] showing hydrocephalus. Pt has been involved with a neurologist  and movement specialist, Dr Jordan.    4/11 s/p LD placement for NPH trial    PLAN:  Neuro:   - stereo CTH completed for possible VPS in future as outpatient,   - improvmeents noted with PT/OT and family agrees  Respiratory:   - satting well on room air  CV:  - vitals stable  - on statin  DVT ppx:   - venodynes while in bed  GI:    - bowel regimen  PT/OT:   - daily PT- outpt PT upon d/c  neuropsych appt as outpatient after d/c today  family updated at bedside   d/c IVL and d/c home today    discussed with Dr Campbell  [facility] # [id number]

## 2023-04-14 NOTE — DISCHARGE NOTE PROVIDER - HOSPITAL COURSE
HPI:  74 y/o male with PMHx of HLD, and left knee replacement presents for Lumbar drain placement today with  4/11/2023. As per starting in 2021, Pt's wife states pt has escalating pacing while walking and wouldn't be able to stop unless he grabbed onto something. Pt also had multiple episodes of tripping upstairs and sliding off the bed at night. Pt also has urinary incontinence and urgency without noticing it. Pt also had multiple episodes of intermittent forgetfulness. In March of 2022, pt had MRI done at [place] [facility] showing hydrocephalus. Pt has been involved with a neurologist  and movement specialist, Dr Jordan. Pt currently in chair, getting blood drawn in no acute distress. (11 Apr 2023 10:14)    Patient admitted 4/11 for lumbar drain insertion for NPH trial (normal pressure hydrocephalus) per protocol. There was improvement noted with daily PT evaluations. Outpatient PT was recommended.   VPS will be scheduled at a later date. On the day of discharge he is medically and neurologically stable for discharge to home.     More than 30 minutes were spent educating the patient and family regarding condition, medications, follow up plans, signs and symptoms to be concerned with, preparing paperwork, and questions answered regarding discharge.

## 2023-04-14 NOTE — DISCHARGE NOTE PROVIDER - NSDCFUADDAPPT_GEN_ALL_CORE_FT
You have an absorbable suture at lumbar drain site. Please keep clean and dry.     Please follow up with Dr Campbell in office in 1-2 weeks for wound check and to schedule placement of ventriculoperitoneal shunt.   You may remove back dressing after shower tomorrow and pat dry and leave open to air. You have an absorbable suture at lumbar drain site. Please keep clean and dry.     Please follow up with Dr Campbell in office in 1-2 weeks for wound check and to schedule placement of ventriculoperitoneal shunt.

## 2023-04-21 ENCOUNTER — APPOINTMENT (OUTPATIENT)
Dept: NEUROSURGERY | Facility: CLINIC | Age: 73
End: 2023-04-21

## 2023-04-24 ENCOUNTER — APPOINTMENT (OUTPATIENT)
Dept: SPINE | Facility: CLINIC | Age: 73
End: 2023-04-24
Payer: MEDICARE

## 2023-04-24 PROCEDURE — 99213 OFFICE O/P EST LOW 20 MIN: CPT

## 2023-04-26 ENCOUNTER — NON-APPOINTMENT (OUTPATIENT)
Age: 73
End: 2023-04-26

## 2023-04-27 ENCOUNTER — EMERGENCY (EMERGENCY)
Facility: HOSPITAL | Age: 73
LOS: 1 days | Discharge: HOME | End: 2023-04-27
Attending: EMERGENCY MEDICINE
Payer: MEDICARE

## 2023-04-27 VITALS
OXYGEN SATURATION: 97 % | TEMPERATURE: 99 F | SYSTOLIC BLOOD PRESSURE: 121 MMHG | WEIGHT: 203.93 LBS | DIASTOLIC BLOOD PRESSURE: 81 MMHG | RESPIRATION RATE: 16 BRPM | HEIGHT: 71 IN | HEART RATE: 82 BPM

## 2023-04-27 VITALS
TEMPERATURE: 98 F | OXYGEN SATURATION: 99 % | DIASTOLIC BLOOD PRESSURE: 84 MMHG | SYSTOLIC BLOOD PRESSURE: 131 MMHG | RESPIRATION RATE: 18 BRPM | HEART RATE: 78 BPM

## 2023-04-27 DIAGNOSIS — Z98.890 OTHER SPECIFIED POSTPROCEDURAL STATES: Chronic | ICD-10-CM

## 2023-04-27 LAB
ALBUMIN SERPL ELPH-MCNC: 4.5 G/DL — SIGNIFICANT CHANGE UP (ref 3.3–5)
ALP SERPL-CCNC: 83 U/L — SIGNIFICANT CHANGE UP (ref 40–120)
ALT FLD-CCNC: 21 U/L — SIGNIFICANT CHANGE UP (ref 10–45)
ANION GAP SERPL CALC-SCNC: 14 MMOL/L — SIGNIFICANT CHANGE UP (ref 5–17)
AST SERPL-CCNC: 19 U/L — SIGNIFICANT CHANGE UP (ref 10–40)
BASOPHILS # BLD AUTO: 0.07 K/UL — SIGNIFICANT CHANGE UP (ref 0–0.2)
BASOPHILS NFR BLD AUTO: 0.6 % — SIGNIFICANT CHANGE UP (ref 0–2)
BILIRUB SERPL-MCNC: 0.6 MG/DL — SIGNIFICANT CHANGE UP (ref 0.2–1.2)
BUN SERPL-MCNC: 18 MG/DL — SIGNIFICANT CHANGE UP (ref 7–23)
CALCIUM SERPL-MCNC: 9.5 MG/DL — SIGNIFICANT CHANGE UP (ref 8.4–10.5)
CHLORIDE SERPL-SCNC: 102 MMOL/L — SIGNIFICANT CHANGE UP (ref 96–108)
CO2 SERPL-SCNC: 21 MMOL/L — LOW (ref 22–31)
CREAT SERPL-MCNC: 1.19 MG/DL — SIGNIFICANT CHANGE UP (ref 0.5–1.3)
EGFR: 64 ML/MIN/1.73M2 — SIGNIFICANT CHANGE UP
EOSINOPHIL # BLD AUTO: 0.06 K/UL — SIGNIFICANT CHANGE UP (ref 0–0.5)
EOSINOPHIL NFR BLD AUTO: 0.5 % — SIGNIFICANT CHANGE UP (ref 0–6)
GLUCOSE SERPL-MCNC: 101 MG/DL — HIGH (ref 70–99)
HCT VFR BLD CALC: 46.8 % — SIGNIFICANT CHANGE UP (ref 39–50)
HGB BLD-MCNC: 15.2 G/DL — SIGNIFICANT CHANGE UP (ref 13–17)
IMM GRANULOCYTES NFR BLD AUTO: 0.4 % — SIGNIFICANT CHANGE UP (ref 0–0.9)
LYMPHOCYTES # BLD AUTO: 1.21 K/UL — SIGNIFICANT CHANGE UP (ref 1–3.3)
LYMPHOCYTES # BLD AUTO: 10.6 % — LOW (ref 13–44)
MCHC RBC-ENTMCNC: 30.2 PG — SIGNIFICANT CHANGE UP (ref 27–34)
MCHC RBC-ENTMCNC: 32.5 GM/DL — SIGNIFICANT CHANGE UP (ref 32–36)
MCV RBC AUTO: 93 FL — SIGNIFICANT CHANGE UP (ref 80–100)
MONOCYTES # BLD AUTO: 0.78 K/UL — SIGNIFICANT CHANGE UP (ref 0–0.9)
MONOCYTES NFR BLD AUTO: 6.8 % — SIGNIFICANT CHANGE UP (ref 2–14)
NEUTROPHILS # BLD AUTO: 9.28 K/UL — HIGH (ref 1.8–7.4)
NEUTROPHILS NFR BLD AUTO: 81.1 % — HIGH (ref 43–77)
NRBC # BLD: 0 /100 WBCS — SIGNIFICANT CHANGE UP (ref 0–0)
PLATELET # BLD AUTO: 324 K/UL — SIGNIFICANT CHANGE UP (ref 150–400)
POTASSIUM SERPL-MCNC: 4.5 MMOL/L — SIGNIFICANT CHANGE UP (ref 3.5–5.3)
POTASSIUM SERPL-SCNC: 4.5 MMOL/L — SIGNIFICANT CHANGE UP (ref 3.5–5.3)
PROT SERPL-MCNC: 8.1 G/DL — SIGNIFICANT CHANGE UP (ref 6–8.3)
RBC # BLD: 5.03 M/UL — SIGNIFICANT CHANGE UP (ref 4.2–5.8)
RBC # FLD: 13.9 % — SIGNIFICANT CHANGE UP (ref 10.3–14.5)
SODIUM SERPL-SCNC: 137 MMOL/L — SIGNIFICANT CHANGE UP (ref 135–145)
WBC # BLD: 11.45 K/UL — HIGH (ref 3.8–10.5)
WBC # FLD AUTO: 11.45 K/UL — HIGH (ref 3.8–10.5)

## 2023-04-27 PROCEDURE — 72125 CT NECK SPINE W/O DYE: CPT | Mod: 26,MA

## 2023-04-27 PROCEDURE — 70450 CT HEAD/BRAIN W/O DYE: CPT | Mod: 26,MA

## 2023-04-27 PROCEDURE — 84100 ASSAY OF PHOSPHORUS: CPT

## 2023-04-27 PROCEDURE — 96374 THER/PROPH/DIAG INJ IV PUSH: CPT

## 2023-04-27 PROCEDURE — 99284 EMERGENCY DEPT VISIT MOD MDM: CPT | Mod: 25

## 2023-04-27 PROCEDURE — 72125 CT NECK SPINE W/O DYE: CPT | Mod: MA

## 2023-04-27 PROCEDURE — 84145 PROCALCITONIN (PCT): CPT

## 2023-04-27 PROCEDURE — 85025 COMPLETE CBC W/AUTO DIFF WBC: CPT

## 2023-04-27 PROCEDURE — 83735 ASSAY OF MAGNESIUM: CPT

## 2023-04-27 PROCEDURE — 70450 CT HEAD/BRAIN W/O DYE: CPT | Mod: MA

## 2023-04-27 PROCEDURE — 99285 EMERGENCY DEPT VISIT HI MDM: CPT

## 2023-04-27 PROCEDURE — 80053 COMPREHEN METABOLIC PANEL: CPT

## 2023-04-27 RX ORDER — CYCLOBENZAPRINE HYDROCHLORIDE 10 MG/1
5 TABLET, FILM COATED ORAL ONCE
Refills: 0 | Status: COMPLETED | OUTPATIENT
Start: 2023-04-27 | End: 2023-04-27

## 2023-04-27 RX ORDER — DIAZEPAM 5 MG
5 TABLET ORAL ONCE
Refills: 0 | Status: DISCONTINUED | OUTPATIENT
Start: 2023-04-27 | End: 2023-04-27

## 2023-04-27 RX ORDER — ACETAMINOPHEN 500 MG
1000 TABLET ORAL ONCE
Refills: 0 | Status: COMPLETED | OUTPATIENT
Start: 2023-04-27 | End: 2023-04-27

## 2023-04-27 RX ADMIN — Medication 400 MILLIGRAM(S): at 13:54

## 2023-04-27 RX ADMIN — CYCLOBENZAPRINE HYDROCHLORIDE 5 MILLIGRAM(S): 10 TABLET, FILM COATED ORAL at 17:00

## 2023-04-27 RX ADMIN — Medication 1000 MILLIGRAM(S): at 14:00

## 2023-04-27 RX ADMIN — Medication 5 MILLIGRAM(S): at 13:27

## 2023-04-27 NOTE — ED ADULT TRIAGE NOTE - HEIGHT IN FEET
OFFICE VISIT      Patient: Pranav Molina Date of Service: 2019   : 2014 MRN: [id number]     SUBJECTIVE:     HISTORY OF PRESENT ILLNESS:  Pranav Molina is a new  5 year old male patient who presents today for Earache      Pt's mom states patient has had a cough and nasal drainage for a little over a week. States last night he seemed warm and early this am woke screaming c/o b/l ear pain. States she has not given him any over the counter medications.         Review of Systems   Constitutional: Positive for fatigue, fever and irritability. Negative for diaphoresis.   HENT: Positive for ear pain and rhinorrhea. Negative for ear discharge and sore throat.    Respiratory: Positive for cough. Negative for shortness of breath and wheezing.    Cardiovascular: Negative.        ALLERGIES:  No Known Allergies    History reviewed. No pertinent past medical history.    Current Outpatient Medications   Medication Sig Dispense Refill   • amoxicillin (AMOXIL) 400 MG/5ML suspension Take 9 mLs by mouth 2 times daily for 10 days. 180 mL 0     No current facility-administered medications for this visit.        History reviewed. No pertinent surgical history.    OBJECTIVE:       Visit Vitals  BP (!) 88/54 (BP Location: [facility], Patient Position: Sitting, Cuff Size: Pediatric)   Pulse 104   Temp 97.9 °F (36.6 °C) (Axillary)   Resp 20   Wt 17.9 kg (39 lb 9.2 oz)       Physical Exam   Constitutional: He appears well-developed and well-nourished. He is active.   HENT:   Right Ear: External ear and canal normal. Tympanic membrane is erythematous and bulging. Tympanic membrane is not perforated.   Left Ear: External ear and canal normal. Tympanic membrane is erythematous and bulging. Tympanic membrane is not perforated.   Nose: Nasal discharge present. No mucosal edema, sinus tenderness or nasal deformity.   Mouth/Throat: Mucous membranes are moist. Dentition is normal. Oropharynx is clear.   Cardiovascular: Normal rate, regular  rhythm, S1 normal and S2 normal.   Pulmonary/Chest: Effort normal and breath sounds normal. There is normal air entry.   Lymphadenopathy: No anterior cervical adenopathy or posterior cervical adenopathy.   Neurological: He is alert.   Vitals reviewed.        Assessment AND PLAN:     1. Bilateral acute otitis media        Orders Placed This Encounter   • amoxicillin (AMOXIL) 400 MG/5ML suspension     Sig: Take 9 mLs by mouth 2 times daily for 10 days.     Dispense:  180 mL     Refill:  0     Pt weight 17.95kg for [facility] Collaborative physician Dr. Althea Morgan       Return if symptoms worsen or fail to improve.    Instructions provided as documented in the AVS.          The Patient indicated understanding of the diagnosis and agreed with the plan of care.     5

## 2023-04-27 NOTE — CONSULT NOTE ADULT - ASSESSMENT
Austin Regan  73M Hx HLD, L TKR, NPH s/p successful LD trial ended 4/13 was seen in office last week to book VPS, p/f worsening neck pain and stiffness of sternoclinomastiod. Denies recent trauma/infection, HA, photophobia, radicular symptoms, paresthesias, weakness and hand clumsiness. afebrile. CTH w/o Heme/nor hygroma, stable ventriculomegaly, C spine with OPLL but no acute fracture nor malalignment.  Exam: AOx3, PERRL, EOMI, no facial, no drift, SCHMITT 5/5, SILT, neg kernig/brudzinski, pain w/extension of SCM minimal pain w/flexion/extension of neck  -CTH, C-spine negative, procal wnl-> no acute neurosurgery intervention  -would refer to movement disorder neurologist Dr. Ochoa for evaluation of torticollis, please have neurology see while in ED
LOIS CARTER is a 73y (1950) RH man with a PMHx significant for HLD, NPH s/p successful LD trial that ended 4/13/23, presenting with acute onset neck pain and stiffness. Patient says symptoms started 2 days ago when he woke up in the morning. Says he slept on a high pillow and woke up being unable to move his neck side to side. Says he has no problems flexing/extending his neck. Denies nausea, vomiting, fever, fatigue, photophobia, vision changes, headaches, weakness, numbness. Tried flexeril which mildly improved symptoms. Received valium in the ED which he says helped significantly with neck pain.     Impression: Neck pain with lateral rotation now improving s/p valium likely MSK etiology. Less likely torticollis given neck not twisted and patient's head is not rotating to compensate.     Recommendations:  []Muscle relaxant-caution patient with use given will make patient drowsy.  []Call Dr. Pepe's office to schedule an appointment for follow up within 1-2 weeks.   []No neurological contraindications to DC    Case d/w on call neurology attending.

## 2023-04-27 NOTE — CONSULT NOTE ADULT - SUBJECTIVE AND OBJECTIVE BOX
Neurology - Consult Note    -  Spectra: [phone] ([facility]), [phone] ([facility])  -    HPI: Patient LOIS CARTER is a 73y (1950) RH man with a PMHx significant for HLD, NPH s/p successful LD trial that ended 4/13/23, presenting with acute onset neck pain and stiffness. Patient says symptoms started 2 days ago when he woke up in the morning. Says he slept on a high pillow and woke up being unable to move his neck side to side. Says he has no problems flexing/extending his neck. Denies nausea, vomiting, fever, fatigue, photophobia, vision changes, headaches, weakness, numbness. Tried flexeril which mildly improved symptoms. Received valium in the ED which he says helped significantly with neck pain.       Review of Systems:   All other review of systems is negative unless indicated above.    Allergies:  shellfish (Anaphylaxis)  shellfish (Short breath)  No Known Drug Allergies      PMHx/PSHx/Family Hx: As above, otherwise see below   HLD (hyperlipidemia)    HLD (hyperlipidemia)    Restless leg    Unstable gait    H/O hydrocephalus    Tremor        Social Hx:  No current use of tobacco, alcohol, or illicit drugs  Lives with wife     Medications:  MEDICATIONS  (STANDING):    MEDICATIONS  (PRN):      Vitals:  T(C): 37 (04-27-23 @ 11:51), Max: 37.2 (04-27-23 @ 11:09)  HR: 85 (04-27-23 @ 11:51) (82 - 85)  BP: 128/86 (04-27-23 @ 11:51) (121/81 - 128/86)  RR: 18 (04-27-23 @ 11:51) (16 - 18)  SpO2: 96% (04-27-23 @ 11:51) (96% - 97%)    Physical Examination:   General - NAD  Cardiovascular - Peripheral pulses palpable  Eyes - Fundoscopy not performed due to safety precautions in the setting of the COVID-19 pandemic    Neurologic Exam:  Mental status - Awake, Alert, Oriented to person, place, and time. Speech fluent, repetition and naming intact. Follows simple and complex commands. Attention/concentration, recent and remote memory (including registration and recall), and fund of knowledge intact    Cranial nerves - PERRLA, VFF, EOMI, face sensation (V1-V3) intact b/l, facial strength intact without asymmetry b/l, hearing intact b/l, palate with symmetric elevation, trapezius 5/5 strength b/l, tongue midline on protrusion with full lateral movement    Motor - Normal bulk and tone throughout. No pronator drift.  Strength testing            Deltoid      Biceps      Triceps     Wrist Extension    Wrist Flexion     Interossei         R            5                 5               5                     5                              5                        5                 5  L             5                 5               5                     5                              5                        5                 5              Hip Flexion    Hip Extension    Knee Flexion    Knee Extension    Dorsiflexion    Plantar Flexion  R              5                           5                       5                           5                            5                          5  L              5                           5                        5                           5                            5                          5    Sensation - Light touch/temperature  intact throughout    DTR's -             Biceps      Triceps     Brachioradialis      Patellar    Ankle    Toes/plantar response  R             1+             1+                  1+               1+          1+                 Down  L              1+             1+                 1+                1+           1+                 Down    Coordination - Finger to Nose intact b/l. No tremors appreciated    Gait and station - given c/f safety, did not assess     Labs:                        15.2   11.45 )-----------( 324      ( 27 Apr 2023 13:30 )             46.8     04-27    137  |  102  |  18  ----------------------------<  101<H>  4.5   |  21<L>  |  1.19    Ca    9.5      27 Apr 2023 13:30  Phos  2.9     04-27  Mg     2.0     04-27    TPro  8.1  /  Alb  4.5  /  TBili  0.6  /  DBili  x   /  AST  19  /  ALT  21  /  AlkPhos  83  04-27    CAPILLARY BLOOD GLUCOSE        LIVER FUNCTIONS - ( 27 Apr 2023 13:30 )  Alb: 4.5 g/dL / Pro: 8.1 g/dL / ALK PHOS: 83 U/L / ALT: 21 U/L / AST: 19 U/L / GGT: x               CSF:                  Radiology:  < from: CT Cervical Spine No Cont (04.27.23 @ 15:29) >  CT HEAD: There is no acute intracranial hemorrhage or depressed calvarial   fracture. There is stable ventriculomegaly with additional findings as   detailed above which can be seen in the setting of normal pressure   hydrocephalus. Recommend correlation with clinical aspects of the case.    CT CERVICAL SPINE: No fracture or acute traumatic malalignment.   Degenerative changes as described.    < end of copied text >      
p (1480)     HPI: 73M Hx HLD, L TKR, NPH s/p successful LD trial ended 4/13 was seen in office last week to book VPS, p/f worsening neck pain and stiffness of sternoclinomastiod. Denies recent trauma/infection, HA, photophobia, radicular symptoms, paresthesias, weakness and hand clumsiness. afebrile. CTH w/o Heme/nor hygroma, stable ventriculomegaly, C spine with OPLL but no acute fracture nor malalignment.  Exam: AOx3, PERRL, EOMI, no facial, no drift, SCHMITT 5/5, SILT, neg kernig/conradoki, pain w/extension of SCM minimal pain w/flexion/extension of neck      --Anticoagulation: none    =====================  PAST MEDICAL HISTORY   HLD (hyperlipidemia)    HLD (hyperlipidemia)    Restless leg    Unstable gait    H/O hydrocephalus    Tremor      PAST SURGICAL HISTORY   H/O arthroscopy of left knee    H/O wrist surgery    H/O thumb surgery    H/O left knee surgery    S/P wrist surgery      shellfish (Anaphylaxis)  shellfish (Short breath)  No Known Drug Allergies      MEDICATIONS:  Antibiotics:    Neuro:    Other:      SOCIAL HISTORY:   Occupation:   Marital Status:     FAMILY HISTORY:  FH: dementia (Father, Mother)        ROS: Negative except per HPI    LABS:                          15.2   11.45 )-----------( 324      ( 27 Apr 2023 13:30 )             46.8     04-27    137  |  102  |  18  ----------------------------<  101<H>  4.5   |  21<L>  |  1.19    Ca    9.5      27 Apr 2023 13:30  Phos  2.9     04-27  Mg     2.0     04-27    TPro  8.1  /  Alb  4.5  /  TBili  0.6  /  DBili  x   /  AST  19  /  ALT  21  /  AlkPhos  83  04-27

## 2023-04-27 NOTE — ED PROVIDER NOTE - PATIENT PORTAL LINK FT
You can access the FollowMyHealth Patient Portal offered by [facility] by registering at the following website: [email or web address]. By joining [facility]’s FollowMyHealth portal, you will also be able to view your health information using other applications (apps) compatible with our system.

## 2023-04-27 NOTE — ED PROVIDER NOTE - CARE PROVIDERS DIRECT ADDRESSES
[email or web address]

## 2023-04-27 NOTE — ED PROVIDER NOTE - PHYSICAL EXAMINATION
Gen: NAD, AOx3  Head: NCAT  HEENT: PERRL, oral mucosa moist, normal conjunctiva, oropharynx clear without exudate or erythema  Lung: CTAB, no respiratory distress, no wheezing, rales, rhonchi  CV: normal s1/s2, rrr, no murmurs, Normal perfusion, pulses 2+ throughout  Abd: soft, NTND, no CVA tenderness  MSK: No edema, no visible deformities, DEc range of motion  to neck with midline tenderness   Neuro: CN II-XII grossly intact, No focal neurologic deficits  Skin: No rash   Psych: normal affect

## 2023-04-27 NOTE — ED ADULT NURSE NOTE - OBJECTIVE STATEMENT
74yo M aaox4 h/o LD, presents to ED from home, as per pt 4/11/23 had LP for hydrocephalous , and schedule for cerebral shunt on 6/23 but about 2 days ago onset a severe L sided neck pain worsening with movement , on examinations Patient is well appearing, skin warm and intact, PERRL, EOM intact, sensory intact, equal strength b/l in all upper and lower extremities, Pt denies CP, SOB, HA, vision changes, n/v/d, fevers chills, abdominal pain, weakness, dizziness, Safety and comfort measures initiated- bed placed in lowest position and side rails raised.

## 2023-04-27 NOTE — ED ADULT NURSE REASSESSMENT NOTE - NS ED NURSE REASSESS COMMENT FT1
Pt verbalizes understanding to f/u with PCP / neurologist and return to ED for any worsening symptoms. Patient d/c home w/ written and verbal instructions. Pt verbalized understanding. IV d/c - No redness or swelling.

## 2023-04-27 NOTE — ED PROVIDER NOTE - OBJECTIVE STATEMENT
74 yo male in blue 32L, accompanied by his wife presents to the ER for evaluation of neck pain x2 days.  Pt awake alert oriented x3 states "I have recently been diagnosed with normal pressure hydrocephalus and had fluid drained two weeks ago. Two days ago on Tuesday I started to have pain in my neck.  The pain is constant sharp pain and worse with any movement . I can barely move my neck at all". Pt with no midline cervical tenderness,  denies recent trauma, Denies headache, nausea, vomiting at this time.   Pt ambulates with steady gait.  No neurological deficits.

## 2023-04-27 NOTE — ED PROVIDER NOTE - ATTENDING APP SHARED VISIT CONTRIBUTION OF CARE
King Valderrama MD:  I personally saw the patient and performed a substantive portion of the visit including all aspects of the medical decision making.    MDM: 73-year-old male with history of hyperlipidemia and NPH with recent lumbar drain, who presents with neck pain and stiffness.  Patient denies any fevers, chills, numbness, weakness, visual changes, trauma, fall, injury, unsteadiness.  Patient states that his symptoms of NPH had improved after drain.    On examination patient with stable vitals.  He is uncomfortable appearing.  He has significantly restricted range of motion of neck most significantly and lateral flexion bilaterally with rotation.  Patient with flexion of approximately 30 degrees and extension of 20 degrees.  No midline tenderness.  Mild left-sided paraspinal tenderness in the cervical spine region.  Consider torticollis, cervical strain.  Patient without any infectious symptoms to point towards meningitis.  Discussed with neurosurgery, recommendations appreciated.    Differential includes but is not limited to: See above    Patient with new problems requiring additional work-up and treatment, following orders: see above  Discussed case with: Neurosurgery team  Obtained and reviewed external records: Discharge note from 4/14/2023  Additional history obtained from: Wife at bedside  Chronic conditions and social determinants of health affecting care: See above King Valderrama MD:  I personally saw the patient and performed a substantive portion of the visit including all aspects of the medical decision making.    MDM: 73-year-old male with history of hyperlipidemia and NPH with recent lumbar drain, who presents with neck pain and stiffness.  Patient denies any fevers, chills, numbness, weakness, visual changes, trauma, fall, injury, unsteadiness.  Patient states that his symptoms of NPH had improved after drain.    On examination patient with stable vitals.  He is uncomfortable appearing.  He has significantly restricted range of motion of neck most significantly and lateral flexion bilaterally with rotation.  Patient with flexion of approximately 30 degrees and extension of 20 degrees.  No midline tenderness.  Mild left-sided paraspinal tenderness in the cervical spine region.  Consider torticollis, cervical strain.  Patient without any infectious symptoms to point towards meningitis.  Discussed with neurosurgery, recommendations appreciated.    Labs show elevated WBC to 11.45, labs otherwise nonactionable.  Procalcitonin of 0.06.  CT head showed evidence of NPH.  CT C-spine showed no fracture and with degenerative changes.  This was discussed with neurosurgery and neurology consultants who deemed patient stable for outpatient management and does not need further ED/CDU/inpatient work-up.    On repeat examination patient with significant improvement in symptoms and range of motion of neck.  Patient able to range to 45 degrees of flexion, 30 degrees of extension, 30 degrees of rotation and lateral flexion.  Patient reassessed and has improved symptoms. Repeat neuro exam is benign without any neuro deficits. Ambulatory in the ED without ataxia or assistance.    Stable for discharge with close follow-up and strict return precautions. Discussed the indications and side-effects of applicable medications. The patient has been informed of all concerning signs and symptoms to return to Emergency Department, the necessity to follow up with PMD within 2-3 days, neurologist within 1 week, and neurosurgeon in 1 week was explained, or to return to the ED if unable to follow-up appropriately, and the patient reports understanding of above with capacity and insight.    Differential includes but is not limited to: See above    Patient with new problems requiring additional work-up and treatment, following orders: see above  Discussed case with: Neurosurgery team  Obtained and reviewed external records: Discharge note from 4/14/2023  Additional history obtained from: Wife at bedside  Chronic conditions and social determinants of health affecting care: See above

## 2023-04-27 NOTE — ED PROVIDER NOTE - PROGRESS NOTE DETAILS
Job Marlow NP-C -  Pt eval by neuro surgery resident   CT head and neck ordered and complete- awaiting results Cleared by neurosurgery and neurology for discharge. recommend followup with movement specialist for torticollis, muscle relaxants PRN. ePrescribe not working at this time. I verbally called cyclobenzaprine 5mg q8hrs PRN dispense 6tabs, 0 refills to [address]. spoke to Pharmacist Sheree. pt pain well controlled. Will dc with follow up. Discussed plan and return precautions with patient who understands and agrees. All questions answered. - Nino Tyler PA-C

## 2023-04-27 NOTE — ED ADULT NURSE NOTE - HISTORY OF COVID-19 VACCINATION
Spoke with mother, she stated pt is currently taking similac advance pro. Let her know pt will switch to enfamil neuropro infant. Mother verbalized understanding.    Yes

## 2023-04-27 NOTE — ED PROVIDER NOTE - NSFOLLOWUPINSTRUCTIONS_ED_ALL_ED_FT
Please follow up with your primary care doctor within 1 week.  Follow up with neurology (movement specialist) Dr. Pepe within 1 week.    *Bring all printed lab/test results to your appointment(s).*    Take cyclobenzaprine as prescribed. DO NOT DRINK OR DRIVE WHILE TAKING. (Please read all medication information/instructions).     Return to the ED for worsening pain, urinary/fecal incontinence, numbness, tingling, weakness, vision changes, or any other concerns.

## 2023-04-27 NOTE — ED PROVIDER NOTE - CARE PROVIDER_API CALL
Harshil Jordan)  Neurology  [address] [place] [id number]  Phone: [phone]  Fax: [phone]  Follow Up Time:

## 2023-05-02 ENCOUNTER — NON-APPOINTMENT (OUTPATIENT)
Age: 73
End: 2023-05-02

## 2023-05-09 ENCOUNTER — OUTPATIENT (OUTPATIENT)
Dept: OUTPATIENT SERVICES | Facility: HOSPITAL | Age: 73
LOS: 1 days | End: 2023-05-09
Payer: MEDICARE

## 2023-05-09 VITALS — WEIGHT: 238.98 LBS | HEIGHT: 71 IN

## 2023-05-09 DIAGNOSIS — Z01.818 ENCOUNTER FOR OTHER PREPROCEDURAL EXAMINATION: ICD-10-CM

## 2023-05-09 DIAGNOSIS — Z98.890 OTHER SPECIFIED POSTPROCEDURAL STATES: Chronic | ICD-10-CM

## 2023-05-09 DIAGNOSIS — G91.2 (IDIOPATHIC) NORMAL PRESSURE HYDROCEPHALUS: ICD-10-CM

## 2023-05-09 DIAGNOSIS — Z96.659 PRESENCE OF UNSPECIFIED ARTIFICIAL KNEE JOINT: Chronic | ICD-10-CM

## 2023-05-09 LAB
ANION GAP SERPL CALC-SCNC: 13 MMOL/L — SIGNIFICANT CHANGE UP (ref 5–17)
BLD GP AB SCN SERPL QL: NEGATIVE — SIGNIFICANT CHANGE UP
BUN SERPL-MCNC: 21 MG/DL — SIGNIFICANT CHANGE UP (ref 7–23)
CALCIUM SERPL-MCNC: 9.6 MG/DL — SIGNIFICANT CHANGE UP (ref 8.4–10.5)
CHLORIDE SERPL-SCNC: 104 MMOL/L — SIGNIFICANT CHANGE UP (ref 96–108)
CO2 SERPL-SCNC: 21 MMOL/L — LOW (ref 22–31)
CREAT SERPL-MCNC: 1.07 MG/DL — SIGNIFICANT CHANGE UP (ref 0.5–1.3)
EGFR: 73 ML/MIN/1.73M2 — SIGNIFICANT CHANGE UP
GLUCOSE SERPL-MCNC: 104 MG/DL — HIGH (ref 70–99)
HCT VFR BLD CALC: 45.6 % — SIGNIFICANT CHANGE UP (ref 39–50)
HGB BLD-MCNC: 15.4 G/DL — SIGNIFICANT CHANGE UP (ref 13–17)
MCHC RBC-ENTMCNC: 30.6 PG — SIGNIFICANT CHANGE UP (ref 27–34)
MCHC RBC-ENTMCNC: 33.8 GM/DL — SIGNIFICANT CHANGE UP (ref 32–36)
MCV RBC AUTO: 90.5 FL — SIGNIFICANT CHANGE UP (ref 80–100)
MRSA PCR RESULT.: SIGNIFICANT CHANGE UP
NRBC # BLD: 0 /100 WBCS — SIGNIFICANT CHANGE UP (ref 0–0)
PLATELET # BLD AUTO: 356 K/UL — SIGNIFICANT CHANGE UP (ref 150–400)
POTASSIUM SERPL-MCNC: 4.3 MMOL/L — SIGNIFICANT CHANGE UP (ref 3.5–5.3)
POTASSIUM SERPL-SCNC: 4.3 MMOL/L — SIGNIFICANT CHANGE UP (ref 3.5–5.3)
RBC # BLD: 5.04 M/UL — SIGNIFICANT CHANGE UP (ref 4.2–5.8)
RBC # FLD: 13.7 % — SIGNIFICANT CHANGE UP (ref 10.3–14.5)
RH IG SCN BLD-IMP: POSITIVE — SIGNIFICANT CHANGE UP
S AUREUS DNA NOSE QL NAA+PROBE: SIGNIFICANT CHANGE UP
SODIUM SERPL-SCNC: 138 MMOL/L — SIGNIFICANT CHANGE UP (ref 135–145)
WBC # BLD: 7.07 K/UL — SIGNIFICANT CHANGE UP (ref 3.8–10.5)
WBC # FLD AUTO: 7.07 K/UL — SIGNIFICANT CHANGE UP (ref 3.8–10.5)

## 2023-05-09 PROCEDURE — 86901 BLOOD TYPING SEROLOGIC RH(D): CPT

## 2023-05-09 PROCEDURE — G0463: CPT

## 2023-05-09 PROCEDURE — 87641 MR-STAPH DNA AMP PROBE: CPT

## 2023-05-09 PROCEDURE — 86850 RBC ANTIBODY SCREEN: CPT

## 2023-05-09 PROCEDURE — 86900 BLOOD TYPING SEROLOGIC ABO: CPT

## 2023-05-09 PROCEDURE — 85027 COMPLETE CBC AUTOMATED: CPT

## 2023-05-09 PROCEDURE — 87640 STAPH A DNA AMP PROBE: CPT

## 2023-05-09 PROCEDURE — 80048 BASIC METABOLIC PNL TOTAL CA: CPT

## 2023-05-09 RX ORDER — ATORVASTATIN CALCIUM 80 MG/1
1 TABLET, FILM COATED ORAL
Qty: 0 | Refills: 0 | DISCHARGE

## 2023-05-09 NOTE — H&P PST ADULT - HISTORY OF PRESENT ILLNESS
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  Presents today for laparoscopic  shunt insertion.    COVID+ 2021, self treated at home

## 2023-05-09 NOTE — H&P PST ADULT - CARDIOVASCULAR
normal/regular rate and rhythm/S1 S2 present/no gallops/no rub/no murmur negative trace bilaterally as per baseline/normal/regular rate and rhythm/S1 S2 present/no gallops/no rub/no murmur/peripheral edema

## 2023-05-09 NOTE — H&P PST ADULT - NSICDXPASTSURGICALHX_GEN_ALL_CORE_FT
PAST SURGICAL HISTORY:  H/O arthroscopy of left knee x3    H/O thumb surgery left thumb- nerve impigment    S/P knee replacement     S/P wrist surgery

## 2023-05-22 ENCOUNTER — APPOINTMENT (OUTPATIENT)
Dept: NEUROLOGY | Facility: CLINIC | Age: 73
End: 2023-05-22

## 2023-05-22 ENCOUNTER — TRANSCRIPTION ENCOUNTER (OUTPATIENT)
Age: 73
End: 2023-05-22

## 2023-05-23 ENCOUNTER — APPOINTMENT (OUTPATIENT)
Dept: SPINE | Facility: HOSPITAL | Age: 73
End: 2023-05-23

## 2023-05-23 ENCOUNTER — INPATIENT (INPATIENT)
Facility: HOSPITAL | Age: 73
LOS: 0 days | Discharge: HOME | DRG: 33 | End: 2023-05-24
Attending: NEUROLOGICAL SURGERY | Admitting: NEUROLOGICAL SURGERY
Payer: MEDICARE

## 2023-05-23 ENCOUNTER — TRANSCRIPTION ENCOUNTER (OUTPATIENT)
Age: 73
End: 2023-05-23

## 2023-05-23 ENCOUNTER — APPOINTMENT (OUTPATIENT)
Dept: SURGERY | Facility: HOSPITAL | Age: 73
End: 2023-05-23
Payer: MEDICARE

## 2023-05-23 VITALS
DIASTOLIC BLOOD PRESSURE: 87 MMHG | HEIGHT: 71 IN | OXYGEN SATURATION: 95 % | RESPIRATION RATE: 18 BRPM | TEMPERATURE: 98 F | SYSTOLIC BLOOD PRESSURE: 143 MMHG | HEART RATE: 58 BPM | WEIGHT: 238.98 LBS

## 2023-05-23 DIAGNOSIS — G91.2 (IDIOPATHIC) NORMAL PRESSURE HYDROCEPHALUS: ICD-10-CM

## 2023-05-23 DIAGNOSIS — Z98.890 OTHER SPECIFIED POSTPROCEDURAL STATES: Chronic | ICD-10-CM

## 2023-05-23 DIAGNOSIS — Z96.659 PRESENCE OF UNSPECIFIED ARTIFICIAL KNEE JOINT: Chronic | ICD-10-CM

## 2023-05-23 PROCEDURE — 70450 CT HEAD/BRAIN W/O DYE: CPT | Mod: 26

## 2023-05-23 PROCEDURE — 62223 ESTABLISH BRAIN CAVITY SHUNT: CPT | Mod: 62

## 2023-05-23 PROCEDURE — 61781 SCAN PROC CRANIAL INTRA: CPT

## 2023-05-23 PROCEDURE — 62223 ESTABLISH BRAIN CAVITY SHUNT: CPT | Mod: AS

## 2023-05-23 DEVICE — BACTISEAL SHUNT CATH KIT WITH BARIUM: Type: IMPLANTABLE DEVICE | Status: FUNCTIONAL

## 2023-05-23 DEVICE — VLV CERTAS PLUS INLINE SIPHON: Type: IMPLANTABLE DEVICE | Status: FUNCTIONAL

## 2023-05-23 DEVICE — SURGIFOAM PAD 8CM X 12.5CM X 10MM (100): Type: IMPLANTABLE DEVICE | Status: FUNCTIONAL

## 2023-05-23 RX ORDER — HYDROMORPHONE HYDROCHLORIDE 2 MG/ML
0.25 INJECTION INTRAMUSCULAR; INTRAVENOUS; SUBCUTANEOUS
Refills: 0 | Status: DISCONTINUED | OUTPATIENT
Start: 2023-05-23 | End: 2023-05-24

## 2023-05-23 RX ORDER — ACETAMINOPHEN 500 MG
1000 TABLET ORAL ONCE
Refills: 0 | Status: COMPLETED | OUTPATIENT
Start: 2023-05-23 | End: 2023-05-23

## 2023-05-23 RX ORDER — CEFAZOLIN SODIUM 1 G
2000 VIAL (EA) INJECTION EVERY 8 HOURS
Refills: 0 | Status: COMPLETED | OUTPATIENT
Start: 2023-05-23 | End: 2023-05-24

## 2023-05-23 RX ORDER — LORATADINE 10 MG/1
10 TABLET ORAL DAILY
Refills: 0 | Status: DISCONTINUED | OUTPATIENT
Start: 2023-05-23 | End: 2023-05-24

## 2023-05-23 RX ORDER — HYDROMORPHONE HYDROCHLORIDE 2 MG/ML
0.5 INJECTION INTRAMUSCULAR; INTRAVENOUS; SUBCUTANEOUS
Refills: 0 | Status: DISCONTINUED | OUTPATIENT
Start: 2023-05-23 | End: 2023-05-24

## 2023-05-23 RX ORDER — CHLORHEXIDINE GLUCONATE 213 G/1000ML
1 SOLUTION TOPICAL ONCE
Refills: 0 | Status: DISCONTINUED | OUTPATIENT
Start: 2023-05-23 | End: 2023-05-23

## 2023-05-23 RX ORDER — ONDANSETRON 8 MG/1
4 TABLET, FILM COATED ORAL ONCE
Refills: 0 | Status: DISCONTINUED | OUTPATIENT
Start: 2023-05-23 | End: 2023-05-24

## 2023-05-23 RX ORDER — CETIRIZINE HYDROCHLORIDE 10 MG/1
1 TABLET ORAL
Refills: 0 | DISCHARGE

## 2023-05-23 RX ORDER — ATORVASTATIN CALCIUM 80 MG/1
1 TABLET, FILM COATED ORAL
Refills: 0 | DISCHARGE

## 2023-05-23 RX ORDER — PANTOPRAZOLE SODIUM 20 MG/1
40 TABLET, DELAYED RELEASE ORAL
Refills: 0 | Status: DISCONTINUED | OUTPATIENT
Start: 2023-05-23 | End: 2023-05-24

## 2023-05-23 RX ORDER — SODIUM CHLORIDE 9 MG/ML
3 INJECTION INTRAMUSCULAR; INTRAVENOUS; SUBCUTANEOUS EVERY 8 HOURS
Refills: 0 | Status: DISCONTINUED | OUTPATIENT
Start: 2023-05-23 | End: 2023-05-23

## 2023-05-23 RX ORDER — ATORVASTATIN CALCIUM 80 MG/1
10 TABLET, FILM COATED ORAL AT BEDTIME
Refills: 0 | Status: DISCONTINUED | OUTPATIENT
Start: 2023-05-23 | End: 2023-05-24

## 2023-05-23 RX ORDER — CEFAZOLIN SODIUM 1 G
2000 VIAL (EA) INJECTION ONCE
Refills: 0 | Status: COMPLETED | OUTPATIENT
Start: 2023-05-23 | End: 2023-05-23

## 2023-05-23 RX ORDER — ONDANSETRON 8 MG/1
4 TABLET, FILM COATED ORAL EVERY 6 HOURS
Refills: 0 | Status: DISCONTINUED | OUTPATIENT
Start: 2023-05-23 | End: 2023-05-24

## 2023-05-23 RX ORDER — SENNA PLUS 8.6 MG/1
2 TABLET ORAL AT BEDTIME
Refills: 0 | Status: DISCONTINUED | OUTPATIENT
Start: 2023-05-23 | End: 2023-05-24

## 2023-05-23 RX ORDER — ACETAMINOPHEN 500 MG
650 TABLET ORAL EVERY 6 HOURS
Refills: 0 | Status: DISCONTINUED | OUTPATIENT
Start: 2023-05-23 | End: 2023-05-24

## 2023-05-23 RX ADMIN — ATORVASTATIN CALCIUM 10 MILLIGRAM(S): 80 TABLET, FILM COATED ORAL at 21:24

## 2023-05-23 RX ADMIN — Medication 400 MILLIGRAM(S): at 20:30

## 2023-05-23 RX ADMIN — Medication 1000 MILLIGRAM(S): at 20:45

## 2023-05-23 RX ADMIN — Medication 100 MILLIGRAM(S): at 19:13

## 2023-05-23 NOTE — BRIEF OPERATIVE NOTE - NSICDXBRIEFPREOP_GEN_ALL_CORE_FT
PRE-OP DIAGNOSIS:  Hydrocephalus 23-May-2023 10:30:12  Christ Zhou  
PRE-OP DIAGNOSIS:  Hydrocephalus 23-May-2023 10:30:12  Christ Zhou

## 2023-05-23 NOTE — PATIENT PROFILE ADULT - FALL HARM RISK - UNIVERSAL INTERVENTIONS
Bed in lowest position, wheels locked, appropriate side rails in place/Call bell, personal items and telephone in reach/Instruct patient to call for assistance before getting out of bed or chair/Non-slip footwear when patient is out of bed/[place] to call system/Physically safe environment - no spills, clutter or unnecessary equipment/Purposeful Proactive Rounding/Room/bathroom lighting operational, light cord in reach

## 2023-05-23 NOTE — PATIENT PROFILE ADULT - MONEY FOR FOOD
Patient returned call. Zio patch results reviewed with patient. All of patients questions answered. Patient requested to have results faxed to PMD Dr. Wyman at [phone]. RN faxed results. RN advised patient to call clinic with any further questions or concerns. Patient acknowledged understanding and agreed with plan.   
RN called patient and left VM for patient to callback and review zio patch results and plan.   
no

## 2023-05-23 NOTE — BRIEF OPERATIVE NOTE - NSICDXBRIEFPOSTOP_GEN_ALL_CORE_FT
POST-OP DIAGNOSIS:  Hydrocephalus 23-May-2023 10:30:20  Christ Zhou  
POST-OP DIAGNOSIS:  Hydrocephalus 23-May-2023 10:30:20  Christ Zhou

## 2023-05-23 NOTE — BRIEF OPERATIVE NOTE - OPERATION/FINDINGS
Right occipital ventriculoperitoneal shunt insertion. Certas valve at 5. General Surgery laparoscopic assistance
abdominal portion of  shunt insertion; catheter tip placed in pelvis

## 2023-05-23 NOTE — BRIEF OPERATIVE NOTE - NSICDXBRIEFPROCEDURE_GEN_ALL_CORE_FT
PROCEDURES:  Insertion, shunt, ventriculoperitoneal, abdominal portion, laparoscopic 23-May-2023 10:30:01  Christ Zhou  
PROCEDURES:  Insertion, shunt, ventriculoperitoneal, abdominal portion, laparoscopic 23-May-2023 10:30:01  Christ Zhou

## 2023-05-24 ENCOUNTER — TRANSCRIPTION ENCOUNTER (OUTPATIENT)
Age: 73
End: 2023-05-24

## 2023-05-24 ENCOUNTER — NON-APPOINTMENT (OUTPATIENT)
Age: 73
End: 2023-05-24

## 2023-05-24 VITALS
OXYGEN SATURATION: 98 % | SYSTOLIC BLOOD PRESSURE: 141 MMHG | RESPIRATION RATE: 16 BRPM | DIASTOLIC BLOOD PRESSURE: 68 MMHG | HEART RATE: 71 BPM

## 2023-05-24 LAB
ANION GAP SERPL CALC-SCNC: 13 MMOL/L — SIGNIFICANT CHANGE UP (ref 5–17)
BUN SERPL-MCNC: 17 MG/DL — SIGNIFICANT CHANGE UP (ref 7–23)
CALCIUM SERPL-MCNC: 8.8 MG/DL — SIGNIFICANT CHANGE UP (ref 8.4–10.5)
CHLORIDE SERPL-SCNC: 102 MMOL/L — SIGNIFICANT CHANGE UP (ref 96–108)
CO2 SERPL-SCNC: 22 MMOL/L — SIGNIFICANT CHANGE UP (ref 22–31)
CREAT SERPL-MCNC: 1.13 MG/DL — SIGNIFICANT CHANGE UP (ref 0.5–1.3)
EGFR: 69 ML/MIN/1.73M2 — SIGNIFICANT CHANGE UP
GLUCOSE SERPL-MCNC: 109 MG/DL — HIGH (ref 70–99)
HCT VFR BLD CALC: 43.7 % — SIGNIFICANT CHANGE UP (ref 39–50)
HGB BLD-MCNC: 15 G/DL — SIGNIFICANT CHANGE UP (ref 13–17)
MCHC RBC-ENTMCNC: 30.7 PG — SIGNIFICANT CHANGE UP (ref 27–34)
MCHC RBC-ENTMCNC: 34.3 GM/DL — SIGNIFICANT CHANGE UP (ref 32–36)
MCV RBC AUTO: 89.5 FL — SIGNIFICANT CHANGE UP (ref 80–100)
NRBC # BLD: 0 /100 WBCS — SIGNIFICANT CHANGE UP (ref 0–0)
PLATELET # BLD AUTO: 258 K/UL — SIGNIFICANT CHANGE UP (ref 150–400)
POTASSIUM SERPL-MCNC: 4.3 MMOL/L — SIGNIFICANT CHANGE UP (ref 3.5–5.3)
POTASSIUM SERPL-SCNC: 4.3 MMOL/L — SIGNIFICANT CHANGE UP (ref 3.5–5.3)
RBC # BLD: 4.88 M/UL — SIGNIFICANT CHANGE UP (ref 4.2–5.8)
RBC # FLD: 13.6 % — SIGNIFICANT CHANGE UP (ref 10.3–14.5)
SODIUM SERPL-SCNC: 137 MMOL/L — SIGNIFICANT CHANGE UP (ref 135–145)
WBC # BLD: 12.75 K/UL — HIGH (ref 3.8–10.5)
WBC # FLD AUTO: 12.75 K/UL — HIGH (ref 3.8–10.5)

## 2023-05-24 PROCEDURE — C1889: CPT

## 2023-05-24 PROCEDURE — C9399: CPT

## 2023-05-24 PROCEDURE — 85027 COMPLETE CBC AUTOMATED: CPT

## 2023-05-24 PROCEDURE — 80048 BASIC METABOLIC PNL TOTAL CA: CPT

## 2023-05-24 PROCEDURE — 97161 PT EVAL LOW COMPLEX 20 MIN: CPT

## 2023-05-24 PROCEDURE — 97165 OT EVAL LOW COMPLEX 30 MIN: CPT

## 2023-05-24 PROCEDURE — 70450 CT HEAD/BRAIN W/O DYE: CPT

## 2023-05-24 RX ORDER — SENNA PLUS 8.6 MG/1
2 TABLET ORAL
Qty: 0 | Refills: 0 | DISCHARGE
Start: 2023-05-24

## 2023-05-24 RX ORDER — ACETAMINOPHEN 500 MG
2 TABLET ORAL
Qty: 0 | Refills: 0 | DISCHARGE
Start: 2023-05-24

## 2023-05-24 RX ADMIN — PANTOPRAZOLE SODIUM 40 MILLIGRAM(S): 20 TABLET, DELAYED RELEASE ORAL at 07:00

## 2023-05-24 RX ADMIN — Medication 650 MILLIGRAM(S): at 10:40

## 2023-05-24 RX ADMIN — Medication 100 MILLIGRAM(S): at 03:00

## 2023-05-24 RX ADMIN — LORATADINE 10 MILLIGRAM(S): 10 TABLET ORAL at 06:00

## 2023-05-24 RX ADMIN — Medication 650 MILLIGRAM(S): at 11:28

## 2023-05-24 RX ADMIN — Medication 650 MILLIGRAM(S): at 04:30

## 2023-05-24 RX ADMIN — Medication 650 MILLIGRAM(S): at 03:45

## 2023-05-24 NOTE — DISCHARGE NOTE PROVIDER - NSDCFUADDAPPT_GEN_ALL_CORE_FT
Please follow up with the following physicians in 1-2 weeks:    1- Dr Campbell- neurosurgeon  2- PCP    You have a programmable shunt and if you have an MRI you should have your shunt reset within 24 hours, please keep a record of your settings. Lester 5.

## 2023-05-24 NOTE — OCCUPATIONAL THERAPY INITIAL EVALUATION ADULT - PERTINENT HX OF CURRENT PROBLEM, REHAB EVAL
73M PMH HLD, osteoarthritis, S/P left total knee replacement 8/25/22, c/o balance issues while recovering from surgery and fell while running, work up found idiopathic normal pressure hydrocephalus.  s/p VPS placement 5/23.

## 2023-05-24 NOTE — CHART NOTE - NSCHARTNOTEFT_GEN_A_CORE
POST-OP NOTE    LOIS CARTER | [id number] | [facility] PACU 10    Procedure: s/p  shunt    Subjective: Patient recovering appropriately in PACU. Denies N/V/abdominal pain. +flatus    Vital Signs Last 24 Hrs  T(C): 36.9 (24 May 2023 06:00), Max: 36.9 (24 May 2023 06:00)  T(F): 98.4 (24 May 2023 06:00), Max: 98.4 (24 May 2023 06:00)  HR: 70 (24 May 2023 10:00) (54 - 98)  BP: 128/63 (24 May 2023 10:00) (104/58 - 173/83)  BP(mean): 95 (24 May 2023 08:00) (76 - 117)  RR: 16 (24 May 2023 10:00) (14 - 18)  SpO2: 99% (24 May 2023 10:00) (93% - 100%)    Parameters below as of 24 May 2023 10:00  Patient On (Oxygen Delivery Method): room air      I&O's Summary    23 May 2023 07:01  -  24 May 2023 07:00  --------------------------------------------------------  IN: 790 mL / OUT: 1550 mL / NET: -760 mL                            15.0   12.75 )-----------( 258      ( 24 May 2023 06:00 )             43.7     05-24    137  |  102  |  17  ----------------------------<  109<H>  4.3   |  22  |  1.13    Ca    8.8      24 May 2023 06:00         PHYSICAL EXAM:  Gen: NAD  Resp: breathing easily, no stridor  CV: RRR  Abdomen: soft, nontender, nondistended, icisions c/d/i     A/P: 73M sp  shunt 5/23    - pain control  - adv diet as tolerated  - care per primary team

## 2023-05-24 NOTE — PROGRESS NOTE ADULT - SUBJECTIVE AND OBJECTIVE BOX
SURGERY POST OP NOTE    STATUS POST:  Placement,  shunt, using frameless stereotaxy and electromagnetic navigation  Insertion, shunt, ventriculoperitoneal, abdominal portion, laparoscopic        SUBJECTIVE: Pt seen and examined at bedside. No complaints, tolerating sips of water. Denies chest pain, SOB, palpitations, HA, fever, chills, N/V.      OBJECTIVE:  Vital Signs Last 24 Hrs  T(C): 36.5 (23 May 2023 10:55), Max: 36.5 (23 May 2023 07:50)  T(F): 97.7 (23 May 2023 10:55), Max: 97.7 (23 May 2023 07:50)  HR: 57 (23 May 2023 13:30) (54 - 59)  BP: 145/73 (23 May 2023 13:30) (124/70 - 154/88)  BP(mean): 102 (23 May 2023 13:30) (91 - 113)  RR: 17 (23 May 2023 13:30) (15 - 18)  SpO2: 100% (23 May 2023 13:30) (95% - 100%)    Parameters below as of 23 May 2023 13:30  Patient On (Oxygen Delivery Method): room air        I&O's Summary      Physical Exam:  General Appearance: Appears well, NAD, A& O x 3  Neck: Supple  Chest: Equal expansion bilaterally, equal breath sounds  CV: Pulse regular presently  Abdomen: Soft, nondistended, appropriate incisional tenderness, dressings clean and dry and intact  Extremities: Grossly symmetric, SCD's in place     LABS:                RADIOLOGY & ADDITIONAL STUDIES:
Patient seen and examined at bedside.    --Anticoagulation--    T(C): 36.5 (05-23-23 @ 10:55), Max: 36.5 (05-23-23 @ 07:50)  HR: 56 (05-23-23 @ 11:30) (56 - 59)  BP: 131/77 (05-23-23 @ 11:30) (124/70 - 143/87)  RR: 16 (05-23-23 @ 11:30) (15 - 18)  SpO2: 100% (05-23-23 @ 11:30) (95% - 100%)  Wt(kg): --    Exam: AOx3, PERRL, EOMI, no facial, no drift SCHMITT 5/5, SILT, incision c/d/i dressing had come off, sterilely replaced  
CHIEF COMPLAINT: patient sitting in chair reports some HA releieved with tylenol    Vital Signs Last 24 Hrs  T(C): 36.9 (24 May 2023 06:00), Max: 36.9 (24 May 2023 06:00)  T(F): 98.4 (24 May 2023 06:00), Max: 98.4 (24 May 2023 06:00)  HR: 71 (24 May 2023 11:00) (65 - 98)  BP: 141/68 (24 May 2023 11:00) (104/58 - 147/71)  BP(mean): 98 (24 May 2023 10:20) (76 - 102)  RR: 16 (24 May 2023 11:00) (14 - 18)  SpO2: 98% (24 May 2023 11:00) (93% - 100%)    Parameters below as of 24 May 2023 11:00  Patient On (Oxygen Delivery Method): room air    PHYSICAL EXAM:    General: No Acute Distress     Neurological: Awake, alert oriented to person, place and time, Following Commands, PERRL, EOMI, Face Symmetrical, Speech Fluent, Moving all extremities, Muscle Strength normal in all four extremities, No Drift, Sensation to Light Touch Intact    Pulmonary: Clear to Auscultation, No Rales, No Rhonchi, No Wheezes     Cardiovascular: S1, S2, Regular Rate and Rhythm     Gastrointestinal: Soft, Nontender, Nondistended     Incision: +staples to head, abd + steri strips c/d/i    LABS:                        15.0   12.75 )-----------( 258      ( 24 May 2023 06:00 )             43.7    05-24    137  |  102  |  17  ----------------------------<  109<H>  4.3   |  22  |  1.13    Ca    8.8      24 May 2023 06:00          05-23 @ 07:01  -  05-24 @ 07:00  --------------------------------------------------------  IN: 790 mL / OUT: 1550 mL / NET: -760 mL        MEDICATIONS:    Endo:  atorvastatin 10 milliGRAM(s) Oral at bedtime    Neuro:  acetaminophen     Tablet .. 650 milliGRAM(s) Oral every 6 hours PRN Temp greater or equal to 38C (100.4F), Mild Pain (1 - 3)  ondansetron Injectable 4 milliGRAM(s) IV Push once PRN Nausea and/or Vomiting  ondansetron Injectable 4 milliGRAM(s) IV Push every 6 hours PRN Nausea and/or Vomiting    Pulm:  loratadine 10 milliGRAM(s) Oral daily    GI/:  pantoprazole    Tablet 40 milliGRAM(s) Oral before breakfast  senna 2 Tablet(s) Oral at bedtime    Other:     DIET: [x] Regular [] CCD [] Renal [] Puree [] Dysphagia [] Tube Feeds:     IMAGING:   < from: CT Head No Cont (05.23.23 @ 16:22) >  IMPRESSION:  No change compared with 4/27/2023 in the ventricle size   status post right parietal shunt placement with tip in the body of the   left lateral ventricle. Postop changes with right frontal extra-axial   air. Right temporal horn air      < end of copied text >

## 2023-05-24 NOTE — DISCHARGE NOTE PROVIDER - NSDCMRMEDTOKEN_GEN_ALL_CORE_FT
acetaminophen 325 mg oral tablet: 2 tab(s) orally every 6 hours As needed Temp greater or equal to 38C (100.4F), Mild Pain (1 - 3)  Aspirin Low Dose 81 mg oral delayed release tablet: 1 tab(s) orally once a day   atorvastatin 10 mg oral tablet: 1 orally once a day  senna leaf extract oral tablet: 2 tab(s) orally once a day (at bedtime)  ZyrTEC 10 mg oral tablet: 1 orally once a day

## 2023-05-24 NOTE — DISCHARGE NOTE NURSING/CASE MANAGEMENT/SOCIAL WORK - PATIENT PORTAL LINK FT
You can access the FollowMyHealth Patient Portal offered by [facility] by registering at the following website: [email or web address]. By joining [facility]’s FollowMyHealth portal, you will also be able to view your health information using other applications (apps) compatible with our system.

## 2023-05-24 NOTE — PROGRESS NOTE ADULT - ASSESSMENT
73M Hx HLD, tremor, restless legs p/f NPH elective VPS w/balance issues now s/p VPS recovering well  -Advance diet as tolerated  -pain control  -PT/OT  -4h PACU then CTH if acceptable will transfer to   -anticipate d/c tomorrow
HPI:  This is a 74 y/o male PMH HLD, osteoarthritis, S/P left total knee replacement 8/25/22, c/o balance issues while recovering from surgery and fell while running, work up found idiopathic normal pressure hydrocephalus.  Presents today for laparoscopic  shunt insertion.    COVID+ 2021, self treated at home     (09 May 2023 08:28)    PAST MEDICAL & SURGICAL HISTORY:  HLD (hyperlipidemia)      Restless leg      Unstable gait      H/O hydrocephalus      Tremor      H/O arthroscopy of left knee  x3      H/O thumb surgery  left thumb- nerve impigment      S/P wrist surgery      S/P knee replacement    PROCEDURE:  5/23/23 s/p laparoscopic right occipital ventriculoperitoneal shunt insertion for hydrocephalus.   Certas valve at 5.    PLAN:  Neuro: tylenol prn pain; CTH reviewed as above  Respiratory: patient instructed on incentive spirometer  CV: resume home asa 81mg  Endocrine: euglycemic           DVT ppx: [] SQH [x] SQL and venodynes bilaterally  Renal: ivl and voiding  ID: afebrile  GI: bowel regimen  PT/OT: no skilled needs  Discussed with patient and family wound care, follow up plans, activity, and medications. Questions answered, and they verbalized understanding.   no RXs d/c ivl and d.;c home today    Will discuss with Dr Shannan Hernandez # [id number]      
ASSESSMENT/PLAN: 73M sp  shunt 5/23.    - pain control  - adv diet as tolerated  - care per primary team    [facility] [id number]

## 2023-05-24 NOTE — DISCHARGE NOTE PROVIDER - NSDCCPCAREPLAN_GEN_ALL_CORE_FT
PRINCIPAL DISCHARGE DIAGNOSIS  Diagnosis: Idiopathic normal pressure hydrocephalus  Assessment and Plan of Treatment: 5/23/23 s/p right occipital ventriculoperitoneal shunt insertion for hydrocephalus. Certas valve at 5.      SECONDARY DISCHARGE DIAGNOSES  Diagnosis: HLD (hyperlipidemia)  Assessment and Plan of Treatment: Please continue medications and follow up with your primary care physician within 1-2 weeks.    Diagnosis: OA (osteoarthritis)  Assessment and Plan of Treatment: Please continue medications and follow up with your primary care physician within 1-2 weeks.

## 2023-05-24 NOTE — PROGRESS NOTE ADULT - REASON FOR ADMISSION
5/23/23 s/p right occipital ventriculoperitoneal shunt insertion for hydrocephalus.   Certas valve at 5.
postop obvs

## 2023-05-24 NOTE — PHYSICAL THERAPY INITIAL EVALUATION ADULT - PERTINENT HX OF CURRENT PROBLEM, REHAB EVAL
Pt is a 74 y/o male PMH HLD, osteoarthritis, S/P left total knee replacement 8/25/22, c/o balance issues while recovering from surgery and fell while running, work up found idiopathic normal pressure hydrocephalus.  S/P laparoscopic  shunt insertion 5/23/23.

## 2023-05-24 NOTE — DISCHARGE NOTE PROVIDER - REASON FOR ADMISSION
5/23/23 s/p right occipital ventriculoperitoneal shunt insertion for hydrocephalus.   Certas valve at 5.

## 2023-05-24 NOTE — DISCHARGE NOTE PROVIDER - CARE PROVIDER_API CALL
Wesley Campbell (MD)  Neurosurgery  [address] [facility], [address]  [place] [address]  Phone: [phone]  Fax: [phone]  Follow Up Time:

## 2023-05-24 NOTE — PHYSICAL THERAPY INITIAL EVALUATION ADULT - ADDITIONAL COMMENTS
Pt lives in a two level house with 5 steps to enter with bilateral hand railings and 15 steps to the second floor with unilateral hand railing on the L. Pt independently drove. Pt lives in a two level house with 7 steps to enter with bilateral hand railings and 15 steps to the second floor with unilateral hand railing on the L. Pt independently drove. Pt's wife available to assist if needed.

## 2023-05-24 NOTE — DISCHARGE NOTE PROVIDER - HOSPITAL COURSE
HPI:  This is a 74 y/o male PMH HLD, osteoarthritis, S/P left total knee replacement 8/25/22, c/o balance issues while recovering from surgery and fell while running, work up found idiopathic normal pressure hydrocephalus.  Presents today for laparoscopic  shunt insertion. COVID+ 2021, self treated at home  (09 May 2023 08:28)    Patient admitted and underwent on 5/23/23 s/p right occipital ventriculoperitoneal shunt insertion for hydrocephalus. Certas valve at 5. He had routine post operative care and did well.   PT evaluated him and recommended no skilled needs. On the day of discharge he is medically and neurologically stable for discharge to home.

## 2023-05-24 NOTE — DISCHARGE NOTE PROVIDER - NSDCFUADDINST_GEN_ALL_CORE_FT
please notify physician if fevers, bleeding, swelling, pain not relieved by medication, increased sluggishness or irritability, increased nausea or vomiting, inability to tolerate foods or liquids, new or increasing weakness/numbness/tingling.   please do not engage in strenuous activity, heavy lifting, drive, or return to work or school until cleared by surgeon.   please keep incision clean and dry, do not submerge wound in water for prolonged periods of time, pat dry after showering, and do not use any creams or ointments to incision.   You may shower and remove dressings tomorrow and leave open to air. Suture/staples to be removed in office follow up and please let steri strips fall off.   Please do not take NSAIDs- ie. advil, motrin, ibuprofen, aleve, naproxen, etc. Tylenol/ acetaminophen ok to take. You may take your daily aspirin.  please notify physician if fevers, bleeding, swelling, pain not relieved by medication, increased sluggishness or irritability, increased nausea or vomiting, inability to tolerate foods or liquids, new or increasing weakness/numbness/tingling.   please do not engage in strenuous activity, heavy lifting, drive, or return to work or school until cleared by surgeon.   please keep incision clean and dry, do not submerge wound in water for prolonged periods of time, pat dry after showering, and do not use any creams or ointments to incision.   You may shower tomorrow and leave open to air. Suture/staples to be removed in office follow up and please let steri strips fall off.   Please do not take NSAIDs- ie. advil, motrin, ibuprofen, aleve, naproxen, etc. Tylenol/ acetaminophen ok to take. You may take your daily aspirin.

## 2023-05-25 ENCOUNTER — INPATIENT (INPATIENT)
Facility: HOSPITAL | Age: 73
LOS: 0 days | Discharge: HOME | DRG: 392 | End: 2023-05-26
Attending: SURGERY | Admitting: SURGERY
Payer: MEDICARE

## 2023-05-25 ENCOUNTER — NON-APPOINTMENT (OUTPATIENT)
Age: 73
End: 2023-05-25

## 2023-05-25 VITALS
RESPIRATION RATE: 16 BRPM | SYSTOLIC BLOOD PRESSURE: 149 MMHG | HEIGHT: 71 IN | OXYGEN SATURATION: 96 % | TEMPERATURE: 98 F | DIASTOLIC BLOOD PRESSURE: 88 MMHG | HEART RATE: 62 BPM | WEIGHT: 238.98 LBS

## 2023-05-25 DIAGNOSIS — Z96.659 PRESENCE OF UNSPECIFIED ARTIFICIAL KNEE JOINT: Chronic | ICD-10-CM

## 2023-05-25 DIAGNOSIS — Z98.890 OTHER SPECIFIED POSTPROCEDURAL STATES: Chronic | ICD-10-CM

## 2023-05-25 DIAGNOSIS — R11.2 NAUSEA WITH VOMITING, UNSPECIFIED: ICD-10-CM

## 2023-05-25 LAB
ALBUMIN SERPL ELPH-MCNC: 4.5 G/DL — SIGNIFICANT CHANGE UP (ref 3.3–5)
ALP SERPL-CCNC: 75 U/L — SIGNIFICANT CHANGE UP (ref 40–120)
ALT FLD-CCNC: 19 U/L — SIGNIFICANT CHANGE UP (ref 10–45)
ANION GAP SERPL CALC-SCNC: 14 MMOL/L — SIGNIFICANT CHANGE UP (ref 5–17)
AST SERPL-CCNC: 17 U/L — SIGNIFICANT CHANGE UP (ref 10–40)
BASE EXCESS BLDV CALC-SCNC: 3.4 MMOL/L — HIGH (ref -2–3)
BASOPHILS # BLD AUTO: 0.03 K/UL — SIGNIFICANT CHANGE UP (ref 0–0.2)
BASOPHILS NFR BLD AUTO: 0.3 % — SIGNIFICANT CHANGE UP (ref 0–2)
BILIRUB SERPL-MCNC: 0.7 MG/DL — SIGNIFICANT CHANGE UP (ref 0.2–1.2)
BUN SERPL-MCNC: 13 MG/DL — SIGNIFICANT CHANGE UP (ref 7–23)
CA-I SERPL-SCNC: 1.18 MMOL/L — SIGNIFICANT CHANGE UP (ref 1.15–1.33)
CALCIUM SERPL-MCNC: 9.4 MG/DL — SIGNIFICANT CHANGE UP (ref 8.4–10.5)
CHLORIDE BLDV-SCNC: 100 MMOL/L — SIGNIFICANT CHANGE UP (ref 96–108)
CHLORIDE SERPL-SCNC: 100 MMOL/L — SIGNIFICANT CHANGE UP (ref 96–108)
CO2 BLDV-SCNC: 30 MMOL/L — HIGH (ref 22–26)
CO2 SERPL-SCNC: 23 MMOL/L — SIGNIFICANT CHANGE UP (ref 22–31)
CREAT SERPL-MCNC: 1.11 MG/DL — SIGNIFICANT CHANGE UP (ref 0.5–1.3)
EGFR: 70 ML/MIN/1.73M2 — SIGNIFICANT CHANGE UP
EOSINOPHIL # BLD AUTO: 0.01 K/UL — SIGNIFICANT CHANGE UP (ref 0–0.5)
EOSINOPHIL NFR BLD AUTO: 0.1 % — SIGNIFICANT CHANGE UP (ref 0–6)
GAS PNL BLDV: 134 MMOL/L — LOW (ref 136–145)
GAS PNL BLDV: SIGNIFICANT CHANGE UP
GAS PNL BLDV: SIGNIFICANT CHANGE UP
GLUCOSE BLDV-MCNC: 120 MG/DL — HIGH (ref 70–99)
GLUCOSE SERPL-MCNC: 116 MG/DL — HIGH (ref 70–99)
HCO3 BLDV-SCNC: 28 MMOL/L — SIGNIFICANT CHANGE UP (ref 22–29)
HCT VFR BLD CALC: 45.3 % — SIGNIFICANT CHANGE UP (ref 39–50)
HCT VFR BLDA CALC: 47 % — SIGNIFICANT CHANGE UP (ref 39–51)
HGB BLD CALC-MCNC: 15.5 G/DL — SIGNIFICANT CHANGE UP (ref 12.6–17.4)
HGB BLD-MCNC: 15.4 G/DL — SIGNIFICANT CHANGE UP (ref 13–17)
IMM GRANULOCYTES NFR BLD AUTO: 0.5 % — SIGNIFICANT CHANGE UP (ref 0–0.9)
LACTATE BLDV-MCNC: 1.6 MMOL/L — SIGNIFICANT CHANGE UP (ref 0.5–2)
LIDOCAIN IGE QN: 18 U/L — SIGNIFICANT CHANGE UP (ref 7–60)
LYMPHOCYTES # BLD AUTO: 1.05 K/UL — SIGNIFICANT CHANGE UP (ref 1–3.3)
LYMPHOCYTES # BLD AUTO: 9.5 % — LOW (ref 13–44)
MCHC RBC-ENTMCNC: 31.1 PG — SIGNIFICANT CHANGE UP (ref 27–34)
MCHC RBC-ENTMCNC: 34 GM/DL — SIGNIFICANT CHANGE UP (ref 32–36)
MCV RBC AUTO: 91.5 FL — SIGNIFICANT CHANGE UP (ref 80–100)
MONOCYTES # BLD AUTO: 0.83 K/UL — SIGNIFICANT CHANGE UP (ref 0–0.9)
MONOCYTES NFR BLD AUTO: 7.5 % — SIGNIFICANT CHANGE UP (ref 2–14)
NEUTROPHILS # BLD AUTO: 9.02 K/UL — HIGH (ref 1.8–7.4)
NEUTROPHILS NFR BLD AUTO: 82.1 % — HIGH (ref 43–77)
NRBC # BLD: 0 /100 WBCS — SIGNIFICANT CHANGE UP (ref 0–0)
PCO2 BLDV: 44 MMHG — SIGNIFICANT CHANGE UP (ref 42–55)
PH BLDV: 7.42 — SIGNIFICANT CHANGE UP (ref 7.32–7.43)
PLATELET # BLD AUTO: 277 K/UL — SIGNIFICANT CHANGE UP (ref 150–400)
PO2 BLDV: 30 MMHG — SIGNIFICANT CHANGE UP (ref 25–45)
POTASSIUM BLDV-SCNC: 4 MMOL/L — SIGNIFICANT CHANGE UP (ref 3.5–5.1)
POTASSIUM SERPL-MCNC: 4.1 MMOL/L — SIGNIFICANT CHANGE UP (ref 3.5–5.3)
POTASSIUM SERPL-SCNC: 4.1 MMOL/L — SIGNIFICANT CHANGE UP (ref 3.5–5.3)
PROT SERPL-MCNC: 7.6 G/DL — SIGNIFICANT CHANGE UP (ref 6–8.3)
RBC # BLD: 4.95 M/UL — SIGNIFICANT CHANGE UP (ref 4.2–5.8)
RBC # FLD: 13.9 % — SIGNIFICANT CHANGE UP (ref 10.3–14.5)
SAO2 % BLDV: 52 % — LOW (ref 67–88)
SODIUM SERPL-SCNC: 137 MMOL/L — SIGNIFICANT CHANGE UP (ref 135–145)
WBC # BLD: 11 K/UL — HIGH (ref 3.8–10.5)
WBC # FLD AUTO: 11 K/UL — HIGH (ref 3.8–10.5)

## 2023-05-25 PROCEDURE — 70250 X-RAY EXAM OF SKULL: CPT | Mod: 26

## 2023-05-25 PROCEDURE — 99291 CRITICAL CARE FIRST HOUR: CPT

## 2023-05-25 PROCEDURE — 71045 X-RAY EXAM CHEST 1 VIEW: CPT | Mod: 26

## 2023-05-25 PROCEDURE — 70450 CT HEAD/BRAIN W/O DYE: CPT | Mod: 26,MA

## 2023-05-25 PROCEDURE — 74018 RADEX ABDOMEN 1 VIEW: CPT | Mod: 26

## 2023-05-25 RX ORDER — SODIUM CHLORIDE 9 MG/ML
1000 INJECTION, SOLUTION INTRAVENOUS ONCE
Refills: 0 | Status: COMPLETED | OUTPATIENT
Start: 2023-05-25 | End: 2023-05-25

## 2023-05-25 RX ORDER — ASPIRIN/CALCIUM CARB/MAGNESIUM 324 MG
81 TABLET ORAL DAILY
Refills: 0 | Status: DISCONTINUED | OUTPATIENT
Start: 2023-05-25 | End: 2023-05-25

## 2023-05-25 RX ORDER — ACETAMINOPHEN 500 MG
1000 TABLET ORAL EVERY 6 HOURS
Refills: 0 | Status: DISCONTINUED | OUTPATIENT
Start: 2023-05-25 | End: 2023-05-26

## 2023-05-25 RX ORDER — ONDANSETRON 8 MG/1
4 TABLET, FILM COATED ORAL ONCE
Refills: 0 | Status: COMPLETED | OUTPATIENT
Start: 2023-05-25 | End: 2023-05-25

## 2023-05-25 RX ORDER — ONDANSETRON 8 MG/1
4 TABLET, FILM COATED ORAL EVERY 6 HOURS
Refills: 0 | Status: DISCONTINUED | OUTPATIENT
Start: 2023-05-25 | End: 2023-05-26

## 2023-05-25 RX ORDER — ACETAMINOPHEN 500 MG
650 TABLET ORAL ONCE
Refills: 0 | Status: COMPLETED | OUTPATIENT
Start: 2023-05-25 | End: 2023-05-25

## 2023-05-25 RX ORDER — ENOXAPARIN SODIUM 100 MG/ML
40 INJECTION SUBCUTANEOUS EVERY 24 HOURS
Refills: 0 | Status: DISCONTINUED | OUTPATIENT
Start: 2023-05-25 | End: 2023-05-26

## 2023-05-25 RX ORDER — ATORVASTATIN CALCIUM 80 MG/1
10 TABLET, FILM COATED ORAL AT BEDTIME
Refills: 0 | Status: DISCONTINUED | OUTPATIENT
Start: 2023-05-25 | End: 2023-05-26

## 2023-05-25 RX ORDER — SENNA PLUS 8.6 MG/1
2 TABLET ORAL AT BEDTIME
Refills: 0 | Status: DISCONTINUED | OUTPATIENT
Start: 2023-05-25 | End: 2023-05-26

## 2023-05-25 RX ORDER — SODIUM CHLORIDE 9 MG/ML
1000 INJECTION, SOLUTION INTRAVENOUS
Refills: 0 | Status: DISCONTINUED | OUTPATIENT
Start: 2023-05-25 | End: 2023-05-26

## 2023-05-25 RX ADMIN — SODIUM CHLORIDE 100 MILLILITER(S): 9 INJECTION, SOLUTION INTRAVENOUS at 18:29

## 2023-05-25 RX ADMIN — ONDANSETRON 4 MILLIGRAM(S): 8 TABLET, FILM COATED ORAL at 10:52

## 2023-05-25 RX ADMIN — Medication 650 MILLIGRAM(S): at 14:40

## 2023-05-25 RX ADMIN — Medication 400 MILLIGRAM(S): at 19:33

## 2023-05-25 RX ADMIN — Medication 1000 MILLIGRAM(S): at 20:30

## 2023-05-25 RX ADMIN — SODIUM CHLORIDE 1000 MILLILITER(S): 9 INJECTION, SOLUTION INTRAVENOUS at 13:55

## 2023-05-25 RX ADMIN — ENOXAPARIN SODIUM 40 MILLIGRAM(S): 100 INJECTION SUBCUTANEOUS at 21:41

## 2023-05-25 RX ADMIN — Medication 650 MILLIGRAM(S): at 14:11

## 2023-05-25 NOTE — CONSULT NOTE ADULT - SUBJECTIVE AND OBJECTIVE BOX
Austin Regan  73M s/p R Occipital VPS for NPH (5/23, Dr. Campbell) dc'd home yesterday, p/w 2 episodes of vomiting. CT shows slight decrease in size of lateral vents. No kinks on shunt series; shunt is at 5. He is afebrile and currently asymptomatic. Exam: AO2 (difficulty with date), PERRL, EOMI, SCHMITT 5/5, SILT, abdomen is soft/nondistended, head/abdominal incisions c/d/i- not infected, shunt pumps and refills briskly.    --Anticoagulation--    T(C): 37.1 (05-25-23 @ 10:47), Max: 37.1 (05-25-23 @ 10:47)  HR: 61 (05-25-23 @ 12:13) (61 - 64)  BP: 133/76 (05-25-23 @ 12:13) (114/74 - 149/88)  RR: 17 (05-25-23 @ 12:13) (15 - 18)  SpO2: 98% (05-25-23 @ 12:13) (96% - 98%)  Wt(kg): --    Exam: AO2 (difficulty with date), PERRL, EOMI, SCHMITT 5/5, SILT, abdomen is soft/nondistended, head/abdominal incisions c/d/i- not infected, shunt pumps and refills briskly.

## 2023-05-25 NOTE — PROVIDER CONTACT NOTE (MEDICATION) - ASSESSMENT
Pt has bedtime lipitor and senna. Patient also has lovenox subq injection. Pt has no diet order at this time

## 2023-05-25 NOTE — ED ADULT NURSE REASSESSMENT NOTE - NS ED NURSE REASSESS COMMENT FT1
Received report from COLTEN Barone. Pt AOx4 with stable VS. Comfort care and safety measures maintained. Pt endorses headache, PRN IV Tylenol to be given.

## 2023-05-25 NOTE — ED PROVIDER NOTE - ATTENDING CONTRIBUTION TO CARE
73-year-old male with recent diagnosis of normal pressure hydrocephalus status post  shunt 2 days ago went home and had vomiting last night since has not been able to take p.o. today had 3 episodes of vomiting nonbloody also reports feeling fatigued but no fever chills no drainage from the surgical sites at home and is not complaining of any abdominal pain denies chest pain shortness of breath has no headache no blurry vision or no diplopia and no urinary symptoms  On physical examination well-appearing alert and oriented and x3 neuro intact the surgical site on the right posterior skull appears intact clear lungs S1-S2 soft nontender abdomen surgical wounds of the laparoscopic abdominal surgery appears intact and clean and no peripheral edema  Concern for shunt malfunction versus disruption less likely intra-abdominal etiology however has no abdominal symptoms except vomiting and episode exam is reassuring  We will do CT of the head and shunt series and lab work and will discuss with neurosurgery who is expecting him

## 2023-05-25 NOTE — PROVIDER CONTACT NOTE (MEDICATION) - ACTION/TREATMENT ORDERED:
Provider made aware. As per provider OK to give lovenox subq injection. Patient to be NPO. Senna and lipitor to be held.

## 2023-05-25 NOTE — H&P ADULT - ASSESSMENT
73M s/p 5/23  shunt placed in abdomen laparoscopically. Presented with nausea since shortly after discharge yesterday with associated nausea. Continues to pass gas.     PLAN  - Diet: NPO, IVF  - PRN zofran  - Pain: PRN IV tylenol  - DVT PPx: lovenox    Discussed with Dr. Juliano Esparza Surgery  [id number]

## 2023-05-25 NOTE — CONSULT NOTE ADULT - ASSESSMENT
Austin Regan  73M s/p R Occipital VPS for NPH (5/23, Dr. Campbell) dc'd home yesterday, p/w 2 episodes of vomiting. CT shows slight decrease in size of lateral vents. No kinks on shunt series; shunt is at 5. He is afebrile and currently asymptomatic. Exam: AO2 (difficulty with date), PERRL, EOMI, SCHMITT 5/5, SILT, abdomen is soft/nondistended, head/abdominal incisions c/d/i- not infected, shunt pumps and refills briskly.    -no acute neurosurgical intervention  -possible gastroenteritis; recommend hydration and clinical monitoring in ED

## 2023-05-25 NOTE — ED PROVIDER NOTE - PHYSICAL EXAMINATION
Crutches as ambulation aid On physical examination well-appearing alert and oriented and x3 neuro intact the surgical site on the right posterior skull appears intact clear lungs S1-S2 soft nontender abdomen surgical wounds of the laparoscopic abdominal surgery appears intact and clean and no peripheral edema

## 2023-05-25 NOTE — ED PROVIDER NOTE - CLINICAL SUMMARY MEDICAL DECISION MAKING FREE TEXT BOX
73M hx of NPH s/p VPS few days ago returning to ED for vomiting. Appears well here, alert and oriented x 4, neurologically intact on exam, no abdominal pain/tenderness beyond mild pain at incision sites to suggest intraabdominal pathology, VPS malfunction, CNS infection. Suspect viral URI/gastroenteritis. NSX to eval. Will check CTH/XR shunt series in addition to labs given very recent VPS placement.

## 2023-05-25 NOTE — PROGRESS NOTE ADULT - SUBJECTIVE AND OBJECTIVE BOX
72 yo 48 hrs from  shunt placed in abdomen laparoscopically.  Was nauseated just after d/c yesterday.  Had several episodes emesis since; remains nauseated.  Passing gas.  Head wounds clean.  Abdomen soft, non-tender, non-distended; wounds clean, dry.  CT head negative.  Will admit for observation, NPO, IV fluids.

## 2023-05-25 NOTE — H&P ADULT - NSHPPHYSICALEXAM_GEN_ALL_CORE
General: NAD, resting comfortably  HEENT: incisions well healing  Pulmonary: normal resp effort, CTA-B  Cardiovascular: NSR, no murmurs  Abdominal: soft, ND/NT, no organomegaly; incisions well healing  Extremities: WWP, normal strength, no clubbing/cyanosis/edema  Neuro: A/O x 3, CNs II-XII grossly intact, normal sensation, no focal deficits

## 2023-05-25 NOTE — H&P ADULT - HISTORY OF PRESENT ILLNESS
73M s/p 5/23  shunt placed in abdomen laparoscopically. Presented with nausea since shortly after discharge yesterday with associated nausea. Continues to pass gas. Pt denies CP, SOB, distension, constipation, dysuria.

## 2023-05-25 NOTE — ED PROVIDER NOTE - OBJECTIVE STATEMENT
73 year old male with history of HLD, osteoarthritis, left total knee replacement, NPH s/p VPS placement dc'd home yesterday, returning to ED for 73 year old male with history of HLD, osteoarthritis, left total knee replacement, NPH s/p VPS placement dc'd home yesterday, returning to ED for vomiting.  Patient and spouse collateral at bedside patient had onset of vomiting last night, cannot tolerate p.o., only had a bit of chicken soup.  Spoke with Dr. Campbell team who recommend the patient return to ED for repeat imaging and eval.  States that he currently does not have nausea, headache, dizziness, vertigo, vision changes, abdominal pain, fevers.

## 2023-05-25 NOTE — ED ADULT NURSE NOTE - NSFALLRISKINTERV_ED_ALL_ED

## 2023-05-25 NOTE — ED ADULT NURSE NOTE - OBJECTIVE STATEMENT
74 y/o M HLD, osteoarthritis, NPH s/p VPS placement presented to ED c/o N/V s/p VPS placement, DC'ed home yesterday. Pt states he has had episodes of nausea and vomiting since discharge, unable to tolerate much PO intake, has had mild headaches 2/10 pain. Pt called neuro team who recommended patient come to ED for eval. On assessment, pt A&Ox3, neuro in tact, GCS 15, PERRL, strength and sensation equal bilaterally in upper and lower extremities. Pt denies dizziness, vertigo, numbness, blurry vision/double vision at this time. Pt denies cp, sob, abd pain. Pt is on CM. Breathing spontaneously and unlabored, speaking full sentences, moving all extremities easily, is ambulatory. Appropriate safety measures in place, call bell within reach, family at bedside.

## 2023-05-25 NOTE — H&P ADULT - NSHPLABSRESULTS_GEN_ALL_CORE
T(C): 37.1 (25 May 2023 18:24), Max: 37.1 (25 May 2023 10:47)  T(F): 98.7 (25 May 2023 18:24), Max: 98.7 (25 May 2023 10:47)  HR: 63 (25 May 2023 18:24) (61 - 71)  BP: 148/84 (25 May 2023 18:24) (114/74 - 149/88)  BP(mean): 106 (25 May 2023 18:24) (87 - 106)  RR: 17 (25 May 2023 18:24) (15 - 18)  SpO2: 99% (25 May 2023 18:24) (96% - 100%)    Parameters below as of 25 May 2023 18:24  Patient On (Oxygen Delivery Method): room air      LABS:                        15.4   11.00 )-----------( 277      ( 25 May 2023 10:59 )             45.3     05-25    137  |  100  |  13  ----------------------------<  116<H>  4.1   |  23  |  1.11    Ca    9.4      25 May 2023 10:59    TPro  7.6  /  Alb  4.5  /  TBili  0.7  /  DBili  x   /  AST  17  /  ALT  19  /  AlkPhos  75  05-25        CAPILLARY BLOOD GLUCOSE        LIVER FUNCTIONS - ( 25 May 2023 10:59 )  Alb: 4.5 g/dL / Pro: 7.6 g/dL / ALK PHOS: 75 U/L / ALT: 19 U/L / AST: 17 U/L / GGT: x             RADIOLOGY & ADDITIONAL STUDIES:

## 2023-05-26 ENCOUNTER — TRANSCRIPTION ENCOUNTER (OUTPATIENT)
Age: 73
End: 2023-05-26

## 2023-05-26 VITALS
OXYGEN SATURATION: 97 % | RESPIRATION RATE: 18 BRPM | DIASTOLIC BLOOD PRESSURE: 77 MMHG | TEMPERATURE: 98 F | SYSTOLIC BLOOD PRESSURE: 118 MMHG | HEART RATE: 61 BPM

## 2023-05-26 LAB
ANION GAP SERPL CALC-SCNC: 12 MMOL/L — SIGNIFICANT CHANGE UP (ref 5–17)
BUN SERPL-MCNC: 15 MG/DL — SIGNIFICANT CHANGE UP (ref 7–23)
CALCIUM SERPL-MCNC: 8.8 MG/DL — SIGNIFICANT CHANGE UP (ref 8.4–10.5)
CHLORIDE SERPL-SCNC: 100 MMOL/L — SIGNIFICANT CHANGE UP (ref 96–108)
CO2 SERPL-SCNC: 23 MMOL/L — SIGNIFICANT CHANGE UP (ref 22–31)
CREAT SERPL-MCNC: 1.04 MG/DL — SIGNIFICANT CHANGE UP (ref 0.5–1.3)
EGFR: 76 ML/MIN/1.73M2 — SIGNIFICANT CHANGE UP
GLUCOSE SERPL-MCNC: 95 MG/DL — SIGNIFICANT CHANGE UP (ref 70–99)
HCT VFR BLD CALC: 43.1 % — SIGNIFICANT CHANGE UP (ref 39–50)
HGB BLD-MCNC: 14.4 G/DL — SIGNIFICANT CHANGE UP (ref 13–17)
MAGNESIUM SERPL-MCNC: 1.9 MG/DL — SIGNIFICANT CHANGE UP (ref 1.6–2.6)
MCHC RBC-ENTMCNC: 30.4 PG — SIGNIFICANT CHANGE UP (ref 27–34)
MCHC RBC-ENTMCNC: 33.4 GM/DL — SIGNIFICANT CHANGE UP (ref 32–36)
MCV RBC AUTO: 90.9 FL — SIGNIFICANT CHANGE UP (ref 80–100)
NRBC # BLD: 0 /100 WBCS — SIGNIFICANT CHANGE UP (ref 0–0)
PHOSPHATE SERPL-MCNC: 2.6 MG/DL — SIGNIFICANT CHANGE UP (ref 2.5–4.5)
PLATELET # BLD AUTO: 242 K/UL — SIGNIFICANT CHANGE UP (ref 150–400)
POTASSIUM SERPL-MCNC: 4.2 MMOL/L — SIGNIFICANT CHANGE UP (ref 3.5–5.3)
POTASSIUM SERPL-SCNC: 4.2 MMOL/L — SIGNIFICANT CHANGE UP (ref 3.5–5.3)
RBC # BLD: 4.74 M/UL — SIGNIFICANT CHANGE UP (ref 4.2–5.8)
RBC # FLD: 13.5 % — SIGNIFICANT CHANGE UP (ref 10.3–14.5)
SODIUM SERPL-SCNC: 135 MMOL/L — SIGNIFICANT CHANGE UP (ref 135–145)
WBC # BLD: 7.74 K/UL — SIGNIFICANT CHANGE UP (ref 3.8–10.5)
WBC # FLD AUTO: 7.74 K/UL — SIGNIFICANT CHANGE UP (ref 3.8–10.5)

## 2023-05-26 PROCEDURE — 82803 BLOOD GASES ANY COMBINATION: CPT

## 2023-05-26 PROCEDURE — 96374 THER/PROPH/DIAG INJ IV PUSH: CPT

## 2023-05-26 PROCEDURE — 82330 ASSAY OF CALCIUM: CPT

## 2023-05-26 PROCEDURE — 82947 ASSAY GLUCOSE BLOOD QUANT: CPT

## 2023-05-26 PROCEDURE — 80053 COMPREHEN METABOLIC PANEL: CPT

## 2023-05-26 PROCEDURE — 93005 ELECTROCARDIOGRAM TRACING: CPT

## 2023-05-26 PROCEDURE — 74018 RADEX ABDOMEN 1 VIEW: CPT

## 2023-05-26 PROCEDURE — 99285 EMERGENCY DEPT VISIT HI MDM: CPT

## 2023-05-26 PROCEDURE — 85014 HEMATOCRIT: CPT

## 2023-05-26 PROCEDURE — 84132 ASSAY OF SERUM POTASSIUM: CPT

## 2023-05-26 PROCEDURE — 83605 ASSAY OF LACTIC ACID: CPT

## 2023-05-26 PROCEDURE — 70250 X-RAY EXAM OF SKULL: CPT

## 2023-05-26 PROCEDURE — 74177 CT ABD & PELVIS W/CONTRAST: CPT

## 2023-05-26 PROCEDURE — 85025 COMPLETE CBC W/AUTO DIFF WBC: CPT

## 2023-05-26 PROCEDURE — 83735 ASSAY OF MAGNESIUM: CPT

## 2023-05-26 PROCEDURE — 74177 CT ABD & PELVIS W/CONTRAST: CPT | Mod: 26

## 2023-05-26 PROCEDURE — 82435 ASSAY OF BLOOD CHLORIDE: CPT

## 2023-05-26 PROCEDURE — 85018 HEMOGLOBIN: CPT

## 2023-05-26 PROCEDURE — 36415 COLL VENOUS BLD VENIPUNCTURE: CPT

## 2023-05-26 PROCEDURE — 80048 BASIC METABOLIC PNL TOTAL CA: CPT

## 2023-05-26 PROCEDURE — 84100 ASSAY OF PHOSPHORUS: CPT

## 2023-05-26 PROCEDURE — 85027 COMPLETE CBC AUTOMATED: CPT

## 2023-05-26 PROCEDURE — 71045 X-RAY EXAM CHEST 1 VIEW: CPT

## 2023-05-26 PROCEDURE — 83690 ASSAY OF LIPASE: CPT

## 2023-05-26 PROCEDURE — 84295 ASSAY OF SERUM SODIUM: CPT

## 2023-05-26 PROCEDURE — 70450 CT HEAD/BRAIN W/O DYE: CPT | Mod: MA

## 2023-05-26 RX ORDER — ONDANSETRON 8 MG/1
4 TABLET, FILM COATED ORAL ONCE
Refills: 0 | Status: COMPLETED | OUTPATIENT
Start: 2023-05-26 | End: 2023-05-26

## 2023-05-26 RX ORDER — ONDANSETRON 8 MG/1
1 TABLET, FILM COATED ORAL
Qty: 28 | Refills: 0
Start: 2023-05-26 | End: 2023-06-01

## 2023-05-26 RX ORDER — METOCLOPRAMIDE HCL 10 MG
10 TABLET ORAL ONCE
Refills: 0 | Status: COMPLETED | OUTPATIENT
Start: 2023-05-26 | End: 2023-05-26

## 2023-05-26 RX ADMIN — ONDANSETRON 4 MILLIGRAM(S): 8 TABLET, FILM COATED ORAL at 07:28

## 2023-05-26 RX ADMIN — Medication 1000 MILLIGRAM(S): at 05:15

## 2023-05-26 RX ADMIN — Medication 10 MILLIGRAM(S): at 08:36

## 2023-05-26 RX ADMIN — SODIUM CHLORIDE 100 MILLILITER(S): 9 INJECTION, SOLUTION INTRAVENOUS at 01:46

## 2023-05-26 RX ADMIN — SODIUM CHLORIDE 100 MILLILITER(S): 9 INJECTION, SOLUTION INTRAVENOUS at 07:28

## 2023-05-26 RX ADMIN — Medication 400 MILLIGRAM(S): at 04:55

## 2023-05-26 RX ADMIN — ONDANSETRON 4 MILLIGRAM(S): 8 TABLET, FILM COATED ORAL at 04:55

## 2023-05-26 RX ADMIN — ONDANSETRON 4 MILLIGRAM(S): 8 TABLET, FILM COATED ORAL at 01:46

## 2023-05-26 NOTE — DISCHARGE NOTE PROVIDER - NSDCACTIVITY_GEN_ALL_CORE
Bathing allowed/Do not drive or operate machinery/Do not make important decisions/Stairs allowed/Walking - Indoors allowed/No heavy lifting/straining/Walking - Outdoors allowed/Follow Instructions Provided by your Surgical Team

## 2023-05-26 NOTE — PROGRESS NOTE ADULT - SUBJECTIVE AND OBJECTIVE BOX
GREEN TEAM SURGERY DAILY PROGRESS NOTE    SUBJECTIVE:   Overnight: no acute events   Patient seen and evaluated on AM rounds.   Patient otherwise denies nausea, vomiting, chest pain, shortness of breath     OBJECTIVE:  Vital Signs Last 24 Hrs  T(C): 36.9 (25 May 2023 23:34), Max: 37.1 (25 May 2023 10:47)  T(F): 98.5 (25 May 2023 23:34), Max: 98.7 (25 May 2023 10:47)  HR: 62 (25 May 2023 23:34) (61 - 74)  BP: 118/70 (25 May 2023 23:34) (114/74 - 160/81)  BP(mean): 106 (25 May 2023 18:24) (87 - 106)  RR: 18 (25 May 2023 23:34) (15 - 18)  SpO2: 96% (25 May 2023 23:34) (93% - 100%)    Parameters below as of 25 May 2023 23:34  Patient On (Oxygen Delivery Method): room air      Daily Height in cm: 180.34 (25 May 2023 10:32)      STANDING  atorvastatin 10 milliGRAM(s) Oral at bedtime  enoxaparin Injectable 40 milliGRAM(s) SubCutaneous every 24 hours  lactated ringers. 1000 milliLiter(s) (100 mL/Hr) IV Continuous <Continuous>  senna 2 Tablet(s) Oral at bedtime    PRN  acetaminophen   IVPB .. 1000 milliGRAM(s) IV Intermittent every 6 hours PRN Mild Pain (1 - 3)  ondansetron Injectable 4 milliGRAM(s) IV Push every 6 hours PRN Nausea      Labs:  137  |  100  |  13  ----------------------------<  116<H>    (05-25)  4.1   |  23  |  1.11            Ca    9.4      05-25  Mg    x  Phos  x              Physical Exam:  General: NAD  HEENT: incisions well healing  Respiratory: respirations non labored  Abdominal: soft, ND/NT, no organomegaly; incisions well healing  Extremities: FROM, warm  Neurological: A+Ox3  GREEN TEAM SURGERY DAILY PROGRESS NOTE    SUBJECTIVE:   Overnight: no acute events   Patient seen and evaluated on AM rounds. Patient reports retching and nausea, but has not vomited. Patient also reports abdominal pain. Patient reports dizziness when walking as well.   Patient otherwise denies fevers/chills, chest pain, palpitations, SOB     OBJECTIVE:  Vital Signs Last 24 Hrs  T(C): 36.6 (26 May 2023 04:59), Max: 37.1 (25 May 2023 10:47)  T(F): 97.9 (26 May 2023 04:59), Max: 98.7 (25 May 2023 10:47)  HR: 61 (26 May 2023 04:59) (61 - 74)  BP: 135/78 (26 May 2023 04:59) (114/74 - 160/81)  BP(mean): 106 (25 May 2023 18:24) (87 - 106)  RR: 18 (26 May 2023 04:59) (15 - 18)  SpO2: 97% (26 May 2023 04:59) (93% - 100%)    Parameters below as of 26 May 2023 04:59  Patient On (Oxygen Delivery Method): room air    I&O's Detail    25 May 2023 07:01  -  26 May 2023 07:00  --------------------------------------------------------  IN:    Lactated Ringers: 700 mL  Total IN: 700 mL    OUT:    Voided (mL): 400 mL  Total OUT: 400 mL    Total NET: 300 mL    MEDICATIONS  (STANDING):  atorvastatin 10 milliGRAM(s) Oral at bedtime  enoxaparin Injectable 40 milliGRAM(s) SubCutaneous every 24 hours  lactated ringers. 1000 milliLiter(s) (100 mL/Hr) IV Continuous <Continuous>  senna 2 Tablet(s) Oral at bedtime    MEDICATIONS  (PRN):  acetaminophen   IVPB .. 1000 milliGRAM(s) IV Intermittent every 6 hours PRN Mild Pain (1 - 3)  ondansetron Injectable 4 milliGRAM(s) IV Push every 6 hours PRN Nausea      Labs:                        15.4   11.00 )-----------( 277      ( 25 May 2023 10:59 )             45.3     05-25    137  |  100  |  13  ----------------------------<  116<H>  4.1   |  23  |  1.11    Ca    9.4      25 May 2023 10:59    TPro  7.6  /  Alb  4.5  /  TBili  0.7  /  DBili  x   /  AST  17  /  ALT  19  /  AlkPhos  75  05-25      Physical Exam:  General: NAD  HEENT: incisions well healing  Respiratory: respirations non labored  Abdominal: soft, ND/NT, no organomegaly; incisions well healing  Extremities: FROM, warm  Neurological: A+Ox3  GREEN TEAM SURGERY DAILY PROGRESS NOTE    SUBJECTIVE:   Overnight: no acute events   Patient seen and evaluated on AM rounds. Patient reports retching and nausea; last vomited "a couple of hours ago." Patient also reports abdominal pain. Patient reports dizziness when walking as well.   Patient otherwise denies fevers/chills, chest pain, palpitations, SOB     OBJECTIVE:  Vital Signs Last 24 Hrs  T(C): 36.6 (26 May 2023 04:59), Max: 37.1 (25 May 2023 10:47)  T(F): 97.9 (26 May 2023 04:59), Max: 98.7 (25 May 2023 10:47)  HR: 61 (26 May 2023 04:59) (61 - 74)  BP: 135/78 (26 May 2023 04:59) (114/74 - 160/81)  BP(mean): 106 (25 May 2023 18:24) (87 - 106)  RR: 18 (26 May 2023 04:59) (15 - 18)  SpO2: 97% (26 May 2023 04:59) (93% - 100%)    Parameters below as of 26 May 2023 04:59  Patient On (Oxygen Delivery Method): room air    I&O's Detail    25 May 2023 07:01  -  26 May 2023 07:00  --------------------------------------------------------  IN:    Lactated Ringers: 700 mL  Total IN: 700 mL    OUT:    Voided (mL): 400 mL  Total OUT: 400 mL    Total NET: 300 mL    MEDICATIONS  (STANDING):  atorvastatin 10 milliGRAM(s) Oral at bedtime  enoxaparin Injectable 40 milliGRAM(s) SubCutaneous every 24 hours  lactated ringers. 1000 milliLiter(s) (100 mL/Hr) IV Continuous <Continuous>  senna 2 Tablet(s) Oral at bedtime    MEDICATIONS  (PRN):  acetaminophen   IVPB .. 1000 milliGRAM(s) IV Intermittent every 6 hours PRN Mild Pain (1 - 3)  ondansetron Injectable 4 milliGRAM(s) IV Push every 6 hours PRN Nausea      Labs:                        15.4   11.00 )-----------( 277      ( 25 May 2023 10:59 )             45.3     05-25    137  |  100  |  13  ----------------------------<  116<H>  4.1   |  23  |  1.11    Ca    9.4      25 May 2023 10:59    TPro  7.6  /  Alb  4.5  /  TBili  0.7  /  DBili  x   /  AST  17  /  ALT  19  /  AlkPhos  75  05-25      Physical Exam:  General: NAD  HEENT: incisions well healing  Respiratory: respirations non labored  Abdominal: soft, ND/NT, no organomegaly; incisions well healing  Extremities: FROM, warm  Neurological: A+Ox3  GREEN TEAM SURGERY DAILY PROGRESS NOTE    SUBJECTIVE:   Overnight: no acute events   Patient seen and evaluated on AM rounds. Patient reports retching and nausea, which improves with Zofran. As per patient, he last vomited "a couple of hours ago." Patient also reports abdominal pain. Patient passing gas, but has not had a bowel movement. Patient reports dizziness when walking as well.   Patient otherwise denies fevers/chills, chest pain, palpitations, SOB     OBJECTIVE:  Vital Signs Last 24 Hrs  T(C): 36.6 (26 May 2023 04:59), Max: 37.1 (25 May 2023 10:47)  T(F): 97.9 (26 May 2023 04:59), Max: 98.7 (25 May 2023 10:47)  HR: 61 (26 May 2023 04:59) (61 - 74)  BP: 135/78 (26 May 2023 04:59) (114/74 - 160/81)  BP(mean): 106 (25 May 2023 18:24) (87 - 106)  RR: 18 (26 May 2023 04:59) (15 - 18)  SpO2: 97% (26 May 2023 04:59) (93% - 100%)    Parameters below as of 26 May 2023 04:59  Patient On (Oxygen Delivery Method): room air    I&O's Detail    25 May 2023 07:01  -  26 May 2023 07:00  --------------------------------------------------------  IN:    Lactated Ringers: 700 mL  Total IN: 700 mL    OUT:    Voided (mL): 400 mL  Total OUT: 400 mL    Total NET: 300 mL    MEDICATIONS  (STANDING):  atorvastatin 10 milliGRAM(s) Oral at bedtime  enoxaparin Injectable 40 milliGRAM(s) SubCutaneous every 24 hours  lactated ringers. 1000 milliLiter(s) (100 mL/Hr) IV Continuous <Continuous>  senna 2 Tablet(s) Oral at bedtime    MEDICATIONS  (PRN):  acetaminophen   IVPB .. 1000 milliGRAM(s) IV Intermittent every 6 hours PRN Mild Pain (1 - 3)  ondansetron Injectable 4 milliGRAM(s) IV Push every 6 hours PRN Nausea      Labs:                        15.4   11.00 )-----------( 277      ( 25 May 2023 10:59 )             45.3     05-25    137  |  100  |  13  ----------------------------<  116<H>  4.1   |  23  |  1.11    Ca    9.4      25 May 2023 10:59    TPro  7.6  /  Alb  4.5  /  TBili  0.7  /  DBili  x   /  AST  17  /  ALT  19  /  AlkPhos  75  05-25      Physical Exam:  General: NAD  HEENT: incisions well healing  Respiratory: respirations non labored  Abdominal: soft, ND/NT, no organomegaly; incisions well healing  Extremities: FROM, warm  Neurological: A+Ox3

## 2023-05-26 NOTE — DISCHARGE NOTE PROVIDER - HOSPITAL COURSE
73M with history of HLD, osteoarthritis, left total knee replacement, NPH s/p VPS placement placed in abdomen laparoscopically on 5/23, dc'd home yesterday (5/24), returning to ED for vomiting. Presented with nausea since shortly after discharge yesterday with associated nausea. Continues to pass gas. Pt denies CP, SOB, distension, constipation, dysuria. Head CT (5/25/23) shows slight decrease in size of lateral vents; no kinks on shunt series; shunt is at 5. Neurosurgery was consulted, and recommended no acute neurosurgical intervention.  Pt was admitted under General Surgery for further evaluation and management. CT A/P (5/26/23) was unremarkable; "Ventriculoperitoneal shunt catheter coursing through the right anterior chest wall and right upper abdominal wall to enter the intra-abdominal cavity in the right upper quadrant, terminating in the right lower quadrant; no kinking or discontinuity seen along the visualized catheter"  Patient was treated with IV fluids and anti-nausea medication. Diet was advanced as tolerated  On the day of discharge, the patient's vitals are stable, pain is controlled, voiding urine, passing gas, tolerating a regular diet, and ambulating well. Pt will f/u with Dr. Campbell in 1-2 weeks. Pt will f/u with PCP in 1-2 weeks.

## 2023-05-26 NOTE — DISCHARGE NOTE PROVIDER - NSDCCPCAREPLAN_GEN_ALL_CORE_FT
PRINCIPAL DISCHARGE DIAGNOSIS  Diagnosis: Nausea and vomiting  Assessment and Plan of Treatment: You were treated with anti-nausea medication and intravenous fluids. Your CT head and CT abdomen/pelvis were all unremarkable. CT head showed slight decrease in size of lateral vents; no kinks on shunt series; shunt is at 5. CT abdomen/pelvis showed Ventriculoperitoneal shunt catheter coursing through the right anterior chest wall and right upper abdominal wall to enter the intra-abdominal cavity in the right upper quadrant, terminating in the right lower quadrant; no kinking or discontinuity seen along the visualized catheter  Please notify physician if fevers, bleeding, swelling, pain not relieved by medication, increased sluggishness or irritability, increased nausea or vomiting, inability to tolerate foods or liquids, new or increasing weakness/numbness/tingling.   Please keep incision clean and dry. Do not submerge wound in water for prolonged periods of time, pat dry after showering, and do not use any creams or ointments to incision. Suture/staples to be removed in office follow up and please let steri strips fall off.   Please do not take NSAIDs- ie. advil, motrin, ibuprofen, aleve, naproxen, etc. Tylenol/ acetaminophen ok to take. You may take your daily Aspirin.

## 2023-05-26 NOTE — DISCHARGE NOTE NURSING/CASE MANAGEMENT/SOCIAL WORK - PATIENT PORTAL LINK FT
You can access the FollowMyHealth Patient Portal offered by [facility] by registering at the following website: [email or web address]. By joining [facility]’s FollowMyHealth portal, you will also be able to view your health information using other applications (apps) compatible with our system.

## 2023-05-26 NOTE — DISCHARGE NOTE PROVIDER - NSDCMRMEDTOKEN_GEN_ALL_CORE_FT
acetaminophen 325 mg oral tablet: 2 tab(s) orally every 6 hours As needed Temp greater or equal to 38C (100.4F), Mild Pain (1 - 3)  Aspirin Low Dose 81 mg oral delayed release tablet: 1 tab(s) orally once a day   atorvastatin 10 mg oral tablet: 1 orally once a day  senna leaf extract oral tablet: 2 tab(s) orally once a day (at bedtime)  ZyrTEC 10 mg oral tablet: 1 orally once a day   acetaminophen 325 mg oral tablet: 2 tab(s) orally every 6 hours As needed Temp greater or equal to 38C (100.4F), Mild Pain (1 - 3)  Aspirin Low Dose 81 mg oral delayed release tablet: 1 tab(s) orally once a day   atorvastatin 10 mg oral tablet: 1 orally once a day  ondansetron 4 mg oral tablet, disintegratin tab(s) orally every 6 hours as needed for  nausea MDD: 4  senna leaf extract oral tablet: 2 tab(s) orally once a day (at bedtime)  ZyrTEC 10 mg oral tablet: 1 orally once a day

## 2023-05-26 NOTE — PROVIDER CONTACT NOTE (OTHER) - ASSESSMENT
pt c/o nausea & vomiting & dry heaving/gagging. Pt vomit approx 200 cc yellow/green emesis while NPO

## 2023-05-26 NOTE — DISCHARGE NOTE PROVIDER - CARE PROVIDERS DIRECT ADDRESSES
[email or web address]

## 2023-05-26 NOTE — DISCHARGE NOTE PROVIDER - NSDCFUSCHEDAPPT_GEN_ALL_CORE_FT
Carin Beltran  [facility]  [facility] [address]  Scheduled Appointment: 05/30/2023    Carin Beltran  [facility]  [address] [address]  Scheduled Appointment: 06/01/2023

## 2023-05-26 NOTE — DISCHARGE NOTE PROVIDER - CARE PROVIDER_API CALL
Wesley Campbell)  Neurosurgery  [address], [address]  [place] [address]  Phone: [phone]  Fax: [phone]  Follow Up Time: 1 week    Osman Henao  Surgery  [address], [address]  [place] [address]  Phone: [phone]  Fax: [phone]  Follow Up Time:

## 2023-05-26 NOTE — DISCHARGE NOTE PROVIDER - NSDCFUADDINST_GEN_ALL_CORE_FT
You have a programmable shunt and if you have an MRI you should have your shunt reset within 24 hours, please keep a record of your settings. Lester 5.

## 2023-05-26 NOTE — PATIENT PROFILE ADULT - FALL HARM RISK - HARM RISK INTERVENTIONS
Assistance with ambulation/Assistance OOB with selected safe patient handling equipment/Communicate Risk of Fall with Harm to all staff/Discuss with provider need for PT consult/Monitor gait and stability/Provide patient with walking aids - walker, cane, crutches/Reinforce activity limits and safety measures with patient and family/Sit up slowly, dangle for a short time, stand at bedside before walking/Tailored Fall Risk Interventions/Visual Cue: Yellow wristband and red socks/Bed in lowest position, wheels locked, appropriate side rails in place/Call bell, personal items and telephone in reach/Instruct patient to call for assistance before getting out of bed or chair/Non-slip footwear when patient is out of bed/[employer] to call system/Physically safe environment - no spills, clutter or unnecessary equipment/Purposeful Proactive Rounding/Room/bathroom lighting operational, light cord in reach

## 2023-05-26 NOTE — PROGRESS NOTE ADULT - ASSESSMENT
73M s/p R Occipital VPS for NPH s/p 5/23  shunt placed in abdomen laparoscopically. Presented with nausea since shortly after discharge yesterday with associated nausea. Continues to pass gas.     PLAN:  - Diet: NPO, IVF  - PRN zofran  - Pain control:  IV Tylenol PRN   - DVT PPX: LVX  - Dispo: pending       [facility]  [id number]   73M s/p R Occipital VPS for NPH s/p 5/23  shunt placed in abdomen laparoscopically. Presented with nausea since shortly after discharge yesterday with associated nausea. Continues to pass gas.     PLAN:  - Fu CT Abd/Pelvis IV & PO contrast  - Diet: NPO, IVF  - PRN zofran  - Pain control:  IV Tylenol PRN   - DVT PPX: LVX  - Dispo: pending       [facility]  [id number]

## 2023-05-30 ENCOUNTER — APPOINTMENT (OUTPATIENT)
Dept: SPINE | Facility: CLINIC | Age: 73
End: 2023-05-30
Payer: MEDICARE

## 2023-05-30 VITALS
HEIGHT: 71 IN | SYSTOLIC BLOOD PRESSURE: 131 MMHG | HEART RATE: 53 BPM | WEIGHT: 239 LBS | OXYGEN SATURATION: 93 % | BODY MASS INDEX: 33.46 KG/M2 | DIASTOLIC BLOOD PRESSURE: 84 MMHG | RESPIRATION RATE: 16 BRPM

## 2023-05-30 PROCEDURE — 99024 POSTOP FOLLOW-UP VISIT: CPT

## 2023-05-30 RX ORDER — ONDANSETRON 4 MG/1
4 TABLET, ORALLY DISINTEGRATING ORAL
Qty: 28 | Refills: 0 | Status: ACTIVE | COMMUNITY
Start: 2023-05-26

## 2023-05-30 NOTE — REASON FOR VISIT
[Spouse] : spouse [de-identified] : Creation of a ventriculoperitoneal shunt using a Certas Codman valve set at 5.0. [de-identified] : 05/23/2023 [de-identified] : 7 [de-identified] : 1 [de-identified] : The patient started to have symptoms of severe headache, nausea, vomiting and fatigue on his way home from being discharged from the hospital.  He remained with symptoms the next day and it as advised that he go to the ER at [facility].  The patient was evaluated and had a CT scan that was stable.  He was found most likely to have gastritis and was evaluated overnight.  The patient stated that he is doing better and no longer has severe headaches or vomiting but stated that he feels like he is in a fog.

## 2023-05-30 NOTE — PHYSICAL EXAM
[General Appearance - Alert] : alert [General Appearance - In No Acute Distress] : in no acute distress [General Appearance - Well Nourished] : well nourished [General Appearance - Well Developed] : well developed [General Appearance - Well-Appearing] : healthy appearing [] : normal voice and communication [Clean] : clean [Dry] : dry [Healing Well] : healing well [Staple Intact] : closed with intact staples [] :  [No Drainage] : without drainage [Normal Skin] : normal [Erythema] : not erythematous [Warm] : not warm [Normal Skin Turgor] : skin turgor was normal [FreeTextEntry1] : Right scalp upsidedown U shape.  Right upper, small below the umbilicus, and to the left abdominal incisions. [FreeTextEntry6] : Steri strips to the abdominal incisions are intact. [Person] : oriented to person [Place] : oriented to place [Time] : oriented to time [Short Term Intact] : short term memory intact [Remote Intact] : remote memory intact [Span Intact] : the attention span was normal [Concentration Intact] : normal concentrating ability [Fluency] : fluency intact [Comprehension] : comprehension intact [Current Events] : adequate knowledge of current events [Past History] : adequate knowledge of personal past history [Vocabulary] : adequate range of vocabulary [Cranial Nerves Optic (II)] : visual acuity intact bilaterally,  pupils equal round and reactive to light [Cranial Nerves Oculomotor (III)] : extraocular motion intact [Cranial Nerves Trigeminal (V)] : facial sensation intact symmetrically [Cranial Nerves Facial (VII)] : face symmetrical [Cranial Nerves Vestibulocochlear (VIII)] : hearing was intact bilaterally [Cranial Nerves Glossopharyngeal (IX)] : tongue and palate midline [Cranial Nerves Accessory (XI - Cranial And Spinal)] : head turning and shoulder shrug symmetric [Cranial Nerves Hypoglossal (XII)] : there was no tongue deviation with protrusion [Motor Tone] : muscle tone was normal in all four extremities [Motor Strength] : muscle strength was normal in all four extremities [No Muscle Atrophy] : normal bulk in all four extremities [5] : S1 flexor hallucis longus 5/5 [Sensation Tactile Decrease] : light touch was intact [Limited Balance] : the patient's balance was impaired [Past-pointing] : there was no past-pointing [Tremor] : no tremor present [2+] : Patella left 2+ [FreeTextEntry8] : The patient ambulates with a normal but slightly stiff and imbalanced gait which is improved from prior to the shunt placement.

## 2023-05-30 NOTE — ASSESSMENT
[FreeTextEntry1] : Austin Regan is a 73 year old gentleman diagnosed with NPH who underwent creation of a  shunt with a Certas Codman valve set at 5.0 with good results of improving his gait and cognition.  His post operative recovery was complicated by GI upset and headaches.  He is doing better.  The patient was provided with a referral to do home physical therapy for gait strengthening and balance training.  His staples were removed.  Wound care was discussed.  He may shower, rinse and pat the incisions dry gently and leave them uncovered.  He is to wear a light hat if he goes outside.  The patient was advised to avoid bending and lifting.  He is to return to the office in 2 weeks with a new CT of the head without contrast for review.

## 2023-05-30 NOTE — HISTORY OF PRESENT ILLNESS
[FreeTextEntry1] : LOIS CARTER is a 73 year old gentleman with a PMH of Parkinson's sydrome who was evaluated for normal pressure hydrocephalus and underwent a 3 day lumbar drain trial with demonstrable improvement in his gait and cognition.  His wife stated that he was cognitively back to his normal self and she noted improvement in his gait.  The patient underwent creation of a ventriculoperitoneal shunt with a Certas Codman valve on 03/23/2023.\par

## 2023-06-01 ENCOUNTER — APPOINTMENT (OUTPATIENT)
Dept: SPINE | Facility: CLINIC | Age: 73
End: 2023-06-01

## 2023-06-12 ENCOUNTER — APPOINTMENT (OUTPATIENT)
Dept: NEUROLOGY | Facility: CLINIC | Age: 73
End: 2023-06-12

## 2023-06-13 ENCOUNTER — NON-APPOINTMENT (OUTPATIENT)
Age: 73
End: 2023-06-13

## 2023-06-13 ENCOUNTER — APPOINTMENT (OUTPATIENT)
Dept: SURGERY | Facility: HOSPITAL | Age: 73
End: 2023-06-13

## 2023-06-14 ENCOUNTER — APPOINTMENT (OUTPATIENT)
Dept: CT IMAGING | Facility: CLINIC | Age: 73
End: 2023-06-14
Payer: MEDICARE

## 2023-06-14 ENCOUNTER — OUTPATIENT (OUTPATIENT)
Dept: OUTPATIENT SERVICES | Facility: HOSPITAL | Age: 73
LOS: 1 days | End: 2023-06-14
Payer: MEDICARE

## 2023-06-14 DIAGNOSIS — G91.2 (IDIOPATHIC) NORMAL PRESSURE HYDROCEPHALUS: ICD-10-CM

## 2023-06-14 DIAGNOSIS — Z98.890 OTHER SPECIFIED POSTPROCEDURAL STATES: Chronic | ICD-10-CM

## 2023-06-14 DIAGNOSIS — Z96.659 PRESENCE OF UNSPECIFIED ARTIFICIAL KNEE JOINT: Chronic | ICD-10-CM

## 2023-06-14 PROCEDURE — 70450 CT HEAD/BRAIN W/O DYE: CPT | Mod: 26

## 2023-06-14 PROCEDURE — 70450 CT HEAD/BRAIN W/O DYE: CPT

## 2023-06-21 ENCOUNTER — APPOINTMENT (OUTPATIENT)
Dept: SPINE | Facility: CLINIC | Age: 73
End: 2023-06-21
Payer: MEDICARE

## 2023-06-21 VITALS
HEART RATE: 62 BPM | OXYGEN SATURATION: 96 % | HEIGHT: 71 IN | BODY MASS INDEX: 33.46 KG/M2 | DIASTOLIC BLOOD PRESSURE: 84 MMHG | SYSTOLIC BLOOD PRESSURE: 132 MMHG | WEIGHT: 239 LBS

## 2023-06-21 PROCEDURE — 62252 CSF SHUNT REPROGRAM: CPT

## 2023-06-21 PROCEDURE — 99024 POSTOP FOLLOW-UP VISIT: CPT

## 2023-07-11 ENCOUNTER — APPOINTMENT (OUTPATIENT)
Dept: SPINE | Facility: CLINIC | Age: 73
End: 2023-07-11
Payer: MEDICARE

## 2023-07-11 VITALS
SYSTOLIC BLOOD PRESSURE: 122 MMHG | HEIGHT: 71 IN | OXYGEN SATURATION: 95 % | WEIGHT: 239 LBS | DIASTOLIC BLOOD PRESSURE: 82 MMHG | HEART RATE: 64 BPM | BODY MASS INDEX: 33.46 KG/M2

## 2023-07-11 PROCEDURE — 99024 POSTOP FOLLOW-UP VISIT: CPT

## 2023-07-11 NOTE — PHYSICAL EXAM
[General Appearance - Alert] : alert [General Appearance - In No Acute Distress] : in no acute distress [General Appearance - Well Nourished] : well nourished [General Appearance - Well Developed] : well developed [General Appearance - Well-Appearing] : healthy appearing [] : normal voice and communication [FreeTextEntry1] : The Certas Codman shunt depresses and refills easily and remains at 6.\par \par The patient is alert and oriented to person, place and time.  Higher cortical functions are intact.  The patient moves all extremities well and to command.  There is no evidence of motor or sensory deficits.  Extremity strength 5/5.  He is hyporeflexive throughout.  Gait is slightly stiff but the patient ambulates at a normal pace.  It remains improved from prior to surgery.\par

## 2023-07-11 NOTE — ASSESSMENT
[FreeTextEntry1] : It was discussed that the CT scan from 06/14/2023 remain stable without any evidence of hemorrhage or collections.  It was discussed that the patient's gate, memory and cognition, or urinary incontinence did not change with the setting at 6.0.  Since the patient's headaches have improved a little but the patient remains with headaches, it was decided to change the setting from 6.0 to 7.0.  The patient is to call next week to report on how he is feeling and is to return to the office in two weeks for evaluation.  It was also recommended that the patient be seen by his ophthalmologist regarding the pain and blurred vision in his left eye.

## 2023-07-11 NOTE — REASON FOR VISIT
[Follow-Up: _____] : a [unfilled] follow-up visit [Spouse] : spouse [FreeTextEntry1] : LOIS CARTER is a 73 year old gentleman who has a history of Parkinson's syndrome, who was evaluated for normal pressure hydrocephalus and underwent a 3 day lumbar drain trial with good improvement in his gait and cognition.  He underwent creation of a ventriculoperitoneal shunt with a Certas Codman shunt set at 5.0 on 05/23/2023.  His gait and cognition improved, however, he had significant headaches.  The shunt setting was changed to 6.0 to decrease the flow and he is here for evaluation.\par The patient returns today stating he remains with headaches, although they have improved, and he has on and off incidences of left eye pain with blurred vision.  He stated that his gait, cognition, and urinary continence remains improved.  The patient has been to his ophthalmologist since the beginning of the year but not since he has been experiencing blurred vision.\par

## 2023-07-25 ENCOUNTER — APPOINTMENT (OUTPATIENT)
Dept: SPINE | Facility: CLINIC | Age: 73
End: 2023-07-25
Payer: MEDICARE

## 2023-07-25 VITALS
HEIGHT: 71 IN | BODY MASS INDEX: 33.46 KG/M2 | HEART RATE: 62 BPM | DIASTOLIC BLOOD PRESSURE: 80 MMHG | SYSTOLIC BLOOD PRESSURE: 116 MMHG | WEIGHT: 239 LBS | OXYGEN SATURATION: 92 %

## 2023-07-25 PROCEDURE — 99024 POSTOP FOLLOW-UP VISIT: CPT

## 2023-07-25 NOTE — REASON FOR VISIT
[Spouse] : spouse [de-identified] : Creation of a ventriculoperitoneal shunt using a Certas Codman valve set at 5.0. [de-identified] : 05/23/2023 [de-identified] : 8 [de-identified] : The patient had been having headache, especially when coughing.  The shunt setting was changed to 6.0 on 06/21/2023  He returned to the office on 07/11/2023 and had some improvement of his headaches except for significant pain when he would cough.  He also complained of occasional pain behind his left eye.  He had little change in his gait or cognition.  The setting was then changed to 7.0 and the patient returns today for evaluation. It was recommended that he see an ophthalmologist and his work up was unremarkable.  He was referred to neuro ophthalmologist, Dr. Noe Pham by his ophthalmologist.  The patient does not think that his gait has changed significantly and his headaches have improved, including the pain behind his left eye.  He does remain with significant head pain 10/10 when he coughs.

## 2023-07-25 NOTE — ASSESSMENT
[FreeTextEntry1] : It was discussed that the shunt will be kept at 7.0.  Dr. Campbell will be consulted regarding the patient's head pain when he coughs.  It is planned that the patient will return to the office in 4 weeks for evaluation.  He is to call with any change of his symptoms such as worsening headache or gait.

## 2023-07-25 NOTE — PHYSICAL EXAM
[FreeTextEntry1] : The Certas Codman shunt depresses and refills easily and is between 6 and 7.  It was adjusted to 7.0.\par \par The patient is alert and oriented to person, place and time.  Higher cortical functions are intact.  Speech is clear and fluent.  His face is symmetrical.  The patient moves all extremities well and to command.  There is no evidence of motor or sensory deficits.  Gait lightly stiff but he walks with a normal pace with good balance.\par

## 2023-07-25 NOTE — HISTORY OF PRESENT ILLNESS
[FreeTextEntry1] : LOIS CARTER is a 73 year old gentleman with a PMH of Parkinson's syndrome who was evaluated for normal pressure hydrocephalus and underwent a 3 day lumbar drain trial with demonstrable improvement in his gait and cognition.  His wife stated that he was cognitively back to his normal self and she noted improvement in his gait.  The patient underwent creation of a ventriculoperitoneal shunt with a Certas Codman valve on 03/23/2023.\par

## 2023-07-27 NOTE — H&P ADULT - PROBLEM SELECTOR PROBLEM 4
PRIMARY CARE CARE COORDINATION   [facility]  [address], [place] [address]    August 3, 2023    Cande Shields  [address]   [place] [address]      Dear Cande,        I am a clinic care coordinator who works with Cira Amanda MD with the Primary Care clinic at the [facility] I wanted to introduce myself and provide you with my contact information for you to be able to call me with any questions or concerns. I have been trying to reach you recently to introduce Clinic Care Coordination. Below is a description of clinic care coordination and how I can further assist you.       The clinic care coordinator nurse is a nurse who understands the health care system. The goal of clinic care coordination is to help you manage your health and improve access to the health care system. Our team works alongside your provider to assist you in determining your health and social needs. We can help you obtain health care and community resources, providing you with necessary information and education. We can work with you through any barriers and develop a care plan that helps coordinate and strengthen the communication between you and your care team. Our services are voluntary and are offered without charge to you personally.    Please feel free to contact me with any questions or concerns regarding care coordination and what we can offer.      We are focused on providing you with the highest-quality healthcare experience possible.    Sincerely,     GERHARD Elise Care Coordinator   Primary Care Clinic   Phone: [phone]  Fax: [phone]                    
Leukocytosis

## 2023-07-30 ENCOUNTER — NON-APPOINTMENT (OUTPATIENT)
Age: 73
End: 2023-07-30

## 2023-08-08 NOTE — DISCHARGE NOTE PROVIDER - DID THE PATIENT PRESENT WITH OR WAS TREATED FOR MALNUTRITION DURING THIS ADMISSION
Physical Therapy Visit    Visit Type: Daily Treatment Note  Visit: 7  Referring Provider: Duane Loya DO  Medical Diagnosis (from order): Diagnosis Information    Diagnosis  715.15 (ICD-9-CM) - M16.12 (ICD-10-CM) - Primary osteoarthritis of left hip         SUBJECTIVE                                                                                                               He reports that he is experiencing absolutely no pain in either knee nor his left hip.  He continues to do well in terms of functional improvement.    DOS: 6/26/23   6 weeks post op on 8/7/23      OBJECTIVE                                                                                                                     Range of Motion (ROM)   (degrees unless noted; active unless noted; norms in ( ); negative=lacking to 0, positive=beyond 0)  Comments: Note: Passive left hip abduction has only minimal restrictions remaining.                       Treatment     Therapeutic Exercise  Passive range-of-motion - to restore left hip abduction and flexion.    Manual Therapy   Soft tissue mobilization - left lateral hip.  Scar mobilization - left hip.    Therapeutic Activity  'Walking for distance.'  Ambulated 150' x 2 with rolling walker.  Supervision x 1.    Neuromuscular Re-Education  Added to existing home program.  Added on 8/8/23.  Patient received instruction, practiced a few repetitions, and handout(s) were issued.  Required verbal and/or tactile cues during performance today to achieve appropriate muscular facilitation, insure correct technique, and inhibit substitution patterns:    Clinic Exercises: (No active left hip abduction x 3 months). (Rest in chair as needed).  TRX: Repeat sit to stand. 2 sets x 10. Emphasis on full left hip extension to neutral.  Parallel bars: Side Steps with NO band.  (No band throughout rehab in our clinic). 5 full laps - short steps to avoid excessive active left hip abduction.  Parallel bars: Left unilateral  balance: 8 second holds x 8.  Deferred today.  Parallel bars: 6 inch ezio walk-overs: forward and lateral x 10 times each.  Deferred today.  Parallel bars: Forward step ups with 4 inch step: 2 x 10.  Deferred today.  Backward step ups: 2 x 10 reps.  Alternate tandem balance. 30\" x 2.  Monster walks with band.  5 full laps.    Only 1:1 skilled therapy time billed for all of today's interventions, additional time spent with physical therapy extender recorded under additional time.    Skilled input: verbal instruction/cues and tactile instruction/cues    Writer verbally educated and received verbal consent for hand placement, positioning of patient, and techniques to be performed today from patient   Home Exercise Program  Exercises given by home therapist: ankle pumps, quad sets, gluteal sets, short arc quads, heel slides, standing alternate marching, walking once per hour.    Access Code: [email or web address]  URL: [email or web address]  Date: 08/08/2023  Prepared by: Zachery Rodriguez  Exercises  - Supine Active Straight Leg Raise (Mirrored)  - 1 x daily - 7 x weekly - 3 sets - 3-5 reps - 0 second hold  - Side Stepping with Counter Support  - 1 x daily - 7 x weekly - 1 sets - 4-8 reps  - Forward and Backward Monster Walk with Resistance at Ankles and Counter Support  - 1 x daily - 7 x weekly - 1 sets - 3-6 reps  - Seated Long Arc Quad (Mirrored)  - 1 x daily - 7 x weekly - 1 sets - 15-20 reps -  5 seconds hold.      ASSESSMENT                                                                                                            Gregory is doing well as he has reached '6 weeks post op' and he is pleased with his progress to date.  He ambulated 150' x 2 today with his rolling walker, though his reliance on this particular assistive device is fading to some degree.  He was able to correctly perform the new home exercises.  He acknowledged understanding the importance of performing home exercises on a  regular basis.  He will benefit from a reassessment on his final appointment next week.    PLAN:  Reassess for discharge on his final visit.  His home program is fully updated.    Zachery Rodriguez PT, DPT 8/8/2023    PLAN                                                                                                                           Suggestions for next session as indicated: Reassess for discharge.       Therapy procedure time and total treatment time can be found documented on the Time Entry flowsheet     No

## 2023-08-15 ENCOUNTER — APPOINTMENT (OUTPATIENT)
Dept: CT IMAGING | Facility: CLINIC | Age: 73
End: 2023-08-15
Payer: MEDICARE

## 2023-08-15 ENCOUNTER — OUTPATIENT (OUTPATIENT)
Dept: OUTPATIENT SERVICES | Facility: HOSPITAL | Age: 73
LOS: 1 days | End: 2023-08-15
Payer: MEDICARE

## 2023-08-15 DIAGNOSIS — G91.2 (IDIOPATHIC) NORMAL PRESSURE HYDROCEPHALUS: ICD-10-CM

## 2023-08-15 DIAGNOSIS — Z98.890 OTHER SPECIFIED POSTPROCEDURAL STATES: Chronic | ICD-10-CM

## 2023-08-15 DIAGNOSIS — Z96.659 PRESENCE OF UNSPECIFIED ARTIFICIAL KNEE JOINT: Chronic | ICD-10-CM

## 2023-08-15 PROCEDURE — 70450 CT HEAD/BRAIN W/O DYE: CPT | Mod: 26

## 2023-08-15 PROCEDURE — 70450 CT HEAD/BRAIN W/O DYE: CPT

## 2023-08-17 ENCOUNTER — NON-APPOINTMENT (OUTPATIENT)
Age: 73
End: 2023-08-17

## 2023-08-21 ENCOUNTER — APPOINTMENT (OUTPATIENT)
Dept: SPINE | Facility: CLINIC | Age: 73
End: 2023-08-21
Payer: MEDICARE

## 2023-08-21 VITALS
OXYGEN SATURATION: 94 % | DIASTOLIC BLOOD PRESSURE: 82 MMHG | SYSTOLIC BLOOD PRESSURE: 117 MMHG | HEIGHT: 71 IN | WEIGHT: 239 LBS | BODY MASS INDEX: 33.46 KG/M2 | HEART RATE: 60 BPM

## 2023-08-21 PROCEDURE — 62252 CSF SHUNT REPROGRAM: CPT

## 2023-08-21 PROCEDURE — 99024 POSTOP FOLLOW-UP VISIT: CPT

## 2023-09-04 ENCOUNTER — NON-APPOINTMENT (OUTPATIENT)
Age: 73
End: 2023-09-04

## 2023-09-12 ENCOUNTER — OUTPATIENT (OUTPATIENT)
Dept: OUTPATIENT SERVICES | Facility: HOSPITAL | Age: 73
LOS: 1 days | End: 2023-09-12
Payer: COMMERCIAL

## 2023-09-12 ENCOUNTER — APPOINTMENT (OUTPATIENT)
Dept: CT IMAGING | Facility: CLINIC | Age: 73
End: 2023-09-12
Payer: SELF-PAY

## 2023-09-12 DIAGNOSIS — Z96.659 PRESENCE OF UNSPECIFIED ARTIFICIAL KNEE JOINT: Chronic | ICD-10-CM

## 2023-09-12 DIAGNOSIS — G91.2 (IDIOPATHIC) NORMAL PRESSURE HYDROCEPHALUS: ICD-10-CM

## 2023-09-12 DIAGNOSIS — Z00.8 ENCOUNTER FOR OTHER GENERAL EXAMINATION: ICD-10-CM

## 2023-09-12 DIAGNOSIS — Z98.890 OTHER SPECIFIED POSTPROCEDURAL STATES: Chronic | ICD-10-CM

## 2023-09-12 PROCEDURE — 70450 CT HEAD/BRAIN W/O DYE: CPT | Mod: 26

## 2023-09-12 PROCEDURE — 70450 CT HEAD/BRAIN W/O DYE: CPT

## 2023-09-18 ENCOUNTER — APPOINTMENT (OUTPATIENT)
Dept: SPINE | Facility: CLINIC | Age: 73
End: 2023-09-18
Payer: MEDICARE

## 2023-09-18 VITALS
HEART RATE: 63 BPM | BODY MASS INDEX: 33.46 KG/M2 | OXYGEN SATURATION: 93 % | WEIGHT: 239 LBS | HEIGHT: 71 IN | SYSTOLIC BLOOD PRESSURE: 123 MMHG | DIASTOLIC BLOOD PRESSURE: 82 MMHG

## 2023-09-18 DIAGNOSIS — G91.2 (IDIOPATHIC) NORMAL PRESSURE HYDROCEPHALUS: ICD-10-CM

## 2023-09-18 PROCEDURE — 99213 OFFICE O/P EST LOW 20 MIN: CPT

## 2023-09-18 RX ORDER — FUROSEMIDE 20 MG/1
20 TABLET ORAL
Refills: 0 | Status: ACTIVE | COMMUNITY

## 2023-12-12 ENCOUNTER — APPOINTMENT (OUTPATIENT)
Dept: NEUROLOGY | Facility: CLINIC | Age: 73
End: 2023-12-12
Payer: MEDICARE

## 2023-12-12 VITALS
BODY MASS INDEX: 33.74 KG/M2 | HEIGHT: 71 IN | HEART RATE: 82 BPM | WEIGHT: 241 LBS | SYSTOLIC BLOOD PRESSURE: 145 MMHG | DIASTOLIC BLOOD PRESSURE: 86 MMHG

## 2023-12-12 PROCEDURE — 99214 OFFICE O/P EST MOD 30 MIN: CPT

## 2023-12-13 NOTE — PHYSICAL EXAM
[FreeTextEntry1] :  There is no facial masking, speech is fluent. Rapid movements do not decrement in the upper extremities. There absence of decrementation in leg lifts and foot taps. Speed is 1+ with no rigidity. is moderate documentation on leg lifts and foot tapping bilaterally. There is no dysmetria on finger-to-nose. Arises with arms crossed. Walks with improved step length, speed, chetan and armswing. No foG. Turns well. Able to tandem walk Sensory exam: light touch was intact.

## 2023-12-13 NOTE — DISCUSSION/SUMMARY
[FreeTextEntry1] : Patient with NPH s/p VPS with marked improvement in gait and brain fog. There is no evidence at this time of parkinsonism. NERY scan was normal. He has cough induced headaches suggestive of possible overshunting, but given his clinical improvement and the intermittent, transient nature of the headaches, we will monitor for now.   f/u 4 months

## 2023-12-13 NOTE — DATA REVIEWED
[de-identified] : Head CTs reviewed from April 2023 to 9/2023. There is minimal changes to ventriculomegaly.

## 2023-12-13 NOTE — HISTORY OF PRESENT ILLNESS
[FreeTextEntry1] : Patient completed NPH w/u by Dr. Campbell in the spring and was implanted with VPS. He has had marked improvement in his gait and balance. He has not fallen nor festinates.  Urinary issues and brain fog have also resolved VPS set at 6 currently. Developed headaches at level 5 , triggered by coughing and sneezing. This started shortly after shunt was placed. Headaches last 1-2 minutes and alleviated with tylenol ES. They are weekly at this time and not associated with n/v. Does not have a headache history.  Denies changes in sense of smell or volume of voice.   Nonmotor There is no RBD Occasional constipation No anosmia  Medications: Atorvastatin CBD Gummies PRN  Tylenol PRN

## 2024-01-26 NOTE — PATIENT PROFILE ADULT - HOW PATIENT ADDRESSED, PROFILE
Preop department called to notify patient of arrival time for scheduled procedure. Instructions given to   - Arrive at [facility] En[facility] [address].  - Remain NPO after midnight, unless otherwise indicated, including gum, mints, and ice chips.   - Have a responsible adult to drive patient to the hospital, stay during surgery, and patient will need supervision 24 hours after anesthesia.   - Use antibacterial soap in shower the night before surgery and on the morning of surgery.       Was patient contacted: yes  Voicemail left:   Numbers contacted: [phone]   Arrival time: 0600      BIll

## 2024-02-29 NOTE — ED PROVIDER NOTE - DURATION
Called pt regarding message. Offered vv appt. Pt declined. Offered in person appt. Pt declined.   6/hour(s)

## 2024-04-17 DIAGNOSIS — R51.9 HEADACHE, UNSPECIFIED: ICD-10-CM

## 2024-04-23 ENCOUNTER — APPOINTMENT (OUTPATIENT)
Dept: CT IMAGING | Facility: CLINIC | Age: 74
End: 2024-04-23
Payer: MEDICARE

## 2024-04-23 ENCOUNTER — OUTPATIENT (OUTPATIENT)
Dept: OUTPATIENT SERVICES | Facility: HOSPITAL | Age: 74
LOS: 1 days | End: 2024-04-23
Payer: MEDICARE

## 2024-04-23 DIAGNOSIS — Z98.890 OTHER SPECIFIED POSTPROCEDURAL STATES: Chronic | ICD-10-CM

## 2024-04-23 DIAGNOSIS — Z96.659 PRESENCE OF UNSPECIFIED ARTIFICIAL KNEE JOINT: Chronic | ICD-10-CM

## 2024-04-23 DIAGNOSIS — G91.2 (IDIOPATHIC) NORMAL PRESSURE HYDROCEPHALUS: ICD-10-CM

## 2024-04-23 DIAGNOSIS — R51.9 HEADACHE, UNSPECIFIED: ICD-10-CM

## 2024-04-23 PROCEDURE — 70450 CT HEAD/BRAIN W/O DYE: CPT | Mod: 26

## 2024-04-23 PROCEDURE — 70450 CT HEAD/BRAIN W/O DYE: CPT

## 2024-04-24 ENCOUNTER — NON-APPOINTMENT (OUTPATIENT)
Age: 74
End: 2024-04-24

## 2024-05-03 ENCOUNTER — APPOINTMENT (OUTPATIENT)
Dept: NEUROLOGY | Facility: CLINIC | Age: 74
End: 2024-05-03
Payer: MEDICARE

## 2024-05-03 PROCEDURE — 99214 OFFICE O/P EST MOD 30 MIN: CPT

## 2024-05-03 NOTE — PHYSICAL EXAM
[FreeTextEntry1] :  There is no facial masking, speech is fluent.EOMi Rapid movements do not decrement in the upper extremities. There absence of decrementation in leg lifts and foot taps. Speed is 1+ with no rigidity.. There is no dysmetria on finger-to-nose. Arises with arms crossed. Walks with improved step length, speed, chetan and armswing. No foG. Turns well. Able to tandem walk

## 2024-05-03 NOTE — DISCUSSION/SUMMARY
[FreeTextEntry1] : 74M with NPH s/p VPS with daily headaches that can last several hrs. Ventricular size is stable.   Patient was counseled on the following recommendations: refer to my colleague Dr. Juarez for WEIR consult.  will f/u afterwards

## 2024-05-03 NOTE — HISTORY OF PRESENT ILLNESS
[FreeTextEntry1] : Patient continues to have daily headaches some times triggered with sneezing. Does not get worse with postural changes. Denies photophobia/phonophobia (-) n/v VPS set at 6 currently. Developed headaches at level 5 . Headaches can last for hrs and needs to take tylenol at times. When VPS was at 7, gait declined and was returned to 6.   Repeat HCT showed stable ventricles   Nonmotor There is no RBD Occasional constipation No anosmia  Medications: Atorvastatin CBD Gummies PRN  Tylenol PRN

## 2024-05-16 RX ORDER — AMOXICILLIN 500 MG/1
500 TABLET, FILM COATED ORAL
Qty: 12 | Refills: 0 | Status: ACTIVE | COMMUNITY
Start: 2024-05-16 | End: 1900-01-01

## 2024-07-28 NOTE — ED ADULT NURSE NOTE - CARDIO ASSESSMENT
During rounds Binta RN notified this rapid response nurse at 0015 for additional assessment of patient because of lethragy and decrease in BP.    Patient just arrived from ED and was alert when the charge RN greeted patient to the unit. When Binta the bedside RN went to assess the patient approximately 10min later she was lethargic and difficult to arouse with sternal rub.  BP on admission was systolic 90s.     This RRN’s focused assessment reveals patient difficult to arouse, HR 77, BP 90/47, O2 sat 94 on room air. Patient was admitted to observation after transferring from nursing home after three episodes of coffee ground emesis. Hgb on admit was 7.8.    This RRN notified the CCU AIP and received orders for stat CBC, 500ml IVF bolus and to transfuse 1uPRBC.    During the blood transfusion BP maintained systolic 90's but still lethargic.     Patient's hospitalist rounded to assess patient during the blood transfusion and ordered protonix drip and for patient to transfer to [facility].    Patient was moved to [facility] at 0130 and is awake answering questions with a /75.   ---

## 2024-07-30 ENCOUNTER — APPOINTMENT (OUTPATIENT)
Dept: NEUROLOGY | Facility: CLINIC | Age: 74
End: 2024-07-30

## 2024-07-30 VITALS
DIASTOLIC BLOOD PRESSURE: 76 MMHG | WEIGHT: 245 LBS | BODY MASS INDEX: 34.3 KG/M2 | HEART RATE: 73 BPM | HEIGHT: 71 IN | SYSTOLIC BLOOD PRESSURE: 129 MMHG

## 2024-07-30 PROCEDURE — 99214 OFFICE O/P EST MOD 30 MIN: CPT

## 2024-07-30 RX ORDER — TAMSULOSIN HYDROCHLORIDE 0.4 MG/1
CAPSULE ORAL
Refills: 0 | Status: ACTIVE | COMMUNITY

## 2024-07-30 NOTE — DISCUSSION/SUMMARY
[FreeTextEntry1] : 74M NPH s/p VPS with improved headaches Probable mild left CTS  Patient was counseled on the following recommendations: use wrist splint at night x 6 weeks. Will let me know if it worsens Cont PT  f/u 6months

## 2024-07-30 NOTE — HISTORY OF PRESENT ILLNESS
[FreeTextEntry1] : Patient continues to have daily headaches some times triggered with sneezing. Does not get worse with postural changes. Denies photophobia/phonophobia (-) n/v VPS set at 6 currently. Developed headaches at level 5 . Headaches can last for hrs and needs to take tylenol at times. When VPS was at 7, gait declined and was returned to 6. Repeat HCT showed stable ventricles   Interim hx  Since his last visit, his headaches have improved. He did not see HA specialist Continues to have cough induced HAs that are mild Has a several HAs a month at this time that resolves when he takes tylenol Has a remote history of migraines Gait has been stable Does PT 3 times per week Denies any falls experiences occ numbness in his left hand when sleeping and driving. Usually resolves when he wring the arm  Nonmotor There is no RBD, sleeps well Occasional constipation No anosmia  Medications: Atorvastatin CBD Gummies PRN  Tylenol PRN

## 2024-07-30 NOTE — PHYSICAL EXAM
[0] : Brachioradialis left 0 [FreeTextEntry1] :  There is no facial masking, speech is fluent. Rapid movements do not decrement in the upper extremities. There absence of decrementation in leg lifts and foot taps. Speed is 1+ with no rigidity.  There is no dysmetria on finger-to-nose. Arises with arms crossed. Walks with improved step length, speed, chetan and armswing. No foG. Turns well. Able to tandem walk  - tinels - phalens

## 2024-10-29 ENCOUNTER — RX ONLY (RX ONLY)
Age: 74
End: 2024-10-29

## 2024-10-29 ENCOUNTER — OFFICE (OUTPATIENT)
Dept: URBAN - METROPOLITAN AREA CLINIC 109 | Facility: CLINIC | Age: 74
Setting detail: OPHTHALMOLOGY
End: 2024-10-29
Payer: MEDICARE

## 2024-10-29 DIAGNOSIS — H16.223: ICD-10-CM

## 2024-10-29 DIAGNOSIS — H10.45: ICD-10-CM

## 2024-10-29 DIAGNOSIS — H25.13: ICD-10-CM

## 2024-10-29 PROCEDURE — 99203 OFFICE O/P NEW LOW 30 MIN: CPT | Performed by: OPHTHALMOLOGY

## 2024-10-29 ASSESSMENT — REFRACTION_AUTOREFRACTION
OD_SPHERE: +1.75
OS_SPHERE: +2.75
OD_CYLINDER: -0.25
OS_CYLINDER: -1.00
OD_AXIS: 148
OS_AXIS: 84

## 2024-10-29 ASSESSMENT — VISUAL ACUITY
OS_BCVA: 20/30-1
OD_BCVA: 20/40

## 2024-10-29 ASSESSMENT — CONFRONTATIONAL VISUAL FIELD TEST (CVF)
OS_FINDINGS: FULL
OD_FINDINGS: FULL

## 2024-10-29 ASSESSMENT — LID EXAM ASSESSMENTS
OS_MEIBOMITIS: LLL LUL T
OD_MEIBOMITIS: RLL RUL T

## 2024-10-29 ASSESSMENT — SUPERFICIAL PUNCTATE KERATITIS (SPK)
OS_SPK: 1+
OD_SPK: 1+

## 2024-10-29 ASSESSMENT — TONOMETRY
OS_IOP_MMHG: 20
OD_IOP_MMHG: 20

## 2025-04-23 NOTE — H&P PST ADULT - CARDIOVASCULAR
Spontaneous, unlabored and symmetrical negative normal/regular rate and rhythm/S1 S2 present/no gallops/no rub/no murmur

## 2025-05-10 ENCOUNTER — NON-APPOINTMENT (OUTPATIENT)
Age: 75
End: 2025-05-10

## 2025-05-13 ENCOUNTER — NON-APPOINTMENT (OUTPATIENT)
Age: 75
End: 2025-05-13

## 2025-05-15 ENCOUNTER — APPOINTMENT (OUTPATIENT)
Dept: NEUROLOGY | Facility: CLINIC | Age: 75
End: 2025-05-15
Payer: MEDICARE

## 2025-05-15 VITALS
HEIGHT: 71 IN | DIASTOLIC BLOOD PRESSURE: 96 MMHG | BODY MASS INDEX: 34.72 KG/M2 | SYSTOLIC BLOOD PRESSURE: 144 MMHG | HEART RATE: 67 BPM | WEIGHT: 248 LBS | RESPIRATION RATE: 21 BRPM | OXYGEN SATURATION: 98 %

## 2025-05-15 PROCEDURE — 99214 OFFICE O/P EST MOD 30 MIN: CPT

## (undated) DEVICE — MARKING PEN W RULER

## (undated) DEVICE — MIDAS REX MR7 LUBRICANT DIFFUSER CARTRIDGE

## (undated) DEVICE — GLV 6.5 PROTEXIS (WHITE)

## (undated) DEVICE — Device

## (undated) DEVICE — TROCAR COVIDIEN VERSASTEP 5MM STANDARD

## (undated) DEVICE — SYR LUER LOK 20CC

## (undated) DEVICE — CODMAN PERFORATOR 14MM (BLUE)

## (undated) DEVICE — DRSG TAPE HYPAFIX 4"

## (undated) DEVICE — SOL IRR POUR NS 0.9% 500ML

## (undated) DEVICE — DRAPE 3/4 SHEET W REINFORCEMENT 56X77"

## (undated) DEVICE — INSUFFLATION NDL COVIDIEN STEP 14G FOR STEP/VERSASTEP

## (undated) DEVICE — TROCAR CODMAN SPLIT DISP

## (undated) DEVICE — GLV 7.5 PROTEXIS (WHITE)

## (undated) DEVICE — DRSG TELFA 3 X 8

## (undated) DEVICE — VENODYNE/SCD SLEEVE CALF LARGE

## (undated) DEVICE — GLV 7 PROTEXIS (WHITE)

## (undated) DEVICE — SUT SOFSILK 2-0 18" TIES

## (undated) DEVICE — MEDTRONIC AXIEM TRACER POINTER

## (undated) DEVICE — PACK VP SHUNT

## (undated) DEVICE — SUT BIOSYN 4-0 18" P-12

## (undated) DEVICE — SUT VICRYL 2-0 18" CP-2 UNDYED (POP-OFF)

## (undated) DEVICE — TUBING INSUFFLATION LAP FILTER 10FT

## (undated) DEVICE — GLV 7.5 PROTEXIS (BLUE)

## (undated) DEVICE — WARMING BLANKET LOWER ADULT

## (undated) DEVICE — GLV 8 PROTEXIS (WHITE)

## (undated) DEVICE — SOLIDIFIER ISOLYZER 2000 CC

## (undated) DEVICE — TROCAR COVIDIEN VERSAPORT BLADELESS OPTICAL 5MM STANDARD

## (undated) DEVICE — SUT POLYSORB 0 36" GU-46

## (undated) DEVICE — DRSG MASTISOL

## (undated) DEVICE — GLV 8.5 PROTEXIS (WHITE)

## (undated) DEVICE — DRAPE MAYO STAND 30"

## (undated) DEVICE — SPECIMEN CONTAINER 100ML

## (undated) DEVICE — SOL IRR POUR H2O 250ML

## (undated) DEVICE — MEDTRONIC AXIEM STYLET 23CM

## (undated) DEVICE — DRAPE TOWEL BLUE 17" X 24"

## (undated) DEVICE — MEDTRONIC AXIEM PATIENT TRACKER NON-INVASIVE

## (undated) DEVICE — SUT VICRYL 3-0 18" X-1 (POP-OFF)

## (undated) DEVICE — TROCAR COVIDIEN BLUNT TIP HASSAN 10MM

## (undated) DEVICE — POSITIONER FOAM EGG CRATE ULNAR 2PCS (PINK)

## (undated) DEVICE — INTRO SHEATH INTEGRA PD 61CM

## (undated) DEVICE — DRSG STERISTRIPS 0.5 X 4"

## (undated) DEVICE — VISITEC 4X4

## (undated) DEVICE — SYR LUER LOK 10CC

## (undated) DEVICE — ELCTR BOVIE TIP BLADE INSULATED 2.75" EDGE

## (undated) DEVICE — STAPLER SKIN VISI-STAT 35 WIDE

## (undated) DEVICE — PREP CHLORAPREP HI-LITE ORANGE 26ML

## (undated) DEVICE — DRAPE 1/2 SHEET 40X57"

## (undated) DEVICE — DRSG XEROFORM 1 X 8"